# Patient Record
Sex: FEMALE | Race: WHITE | NOT HISPANIC OR LATINO | Employment: FULL TIME | URBAN - METROPOLITAN AREA
[De-identification: names, ages, dates, MRNs, and addresses within clinical notes are randomized per-mention and may not be internally consistent; named-entity substitution may affect disease eponyms.]

---

## 2017-01-13 ENCOUNTER — LAB (OUTPATIENT)
Dept: LAB | Facility: HOSPITAL | Age: 42
End: 2017-01-13

## 2017-01-13 DIAGNOSIS — Z34.81 PRENATAL CARE, SUBSEQUENT PREGNANCY, FIRST TRIMESTER: ICD-10-CM

## 2017-01-13 DIAGNOSIS — Z3A.08 8 WEEKS GESTATION OF PREGNANCY: Primary | ICD-10-CM

## 2017-01-13 LAB
ABO GROUP BLD: NORMAL
AMPHET+METHAMPHET UR QL: NEGATIVE
AMPHETAMINES UR QL: NEGATIVE
BACTERIA UR QL AUTO: ABNORMAL /HPF
BARBITURATES UR QL SCN: NEGATIVE
BASOPHILS # BLD AUTO: 0.01 10*3/MM3 (ref 0–0.2)
BASOPHILS NFR BLD AUTO: 0.1 % (ref 0–1)
BENZODIAZ UR QL SCN: NEGATIVE
BILIRUB UR QL STRIP: NEGATIVE
BLD GP AB SCN SERPL QL: NEGATIVE
BUPRENORPHINE SERPL-MCNC: NEGATIVE NG/ML
CANNABINOIDS SERPL QL: NEGATIVE
CLARITY UR: CLEAR
COCAINE UR QL: NEGATIVE
COLOR UR: YELLOW
DEPRECATED RDW RBC AUTO: 42.4 FL (ref 37–54)
EOSINOPHIL # BLD AUTO: 0.1 10*3/MM3 (ref 0.1–0.3)
EOSINOPHIL NFR BLD AUTO: 1.3 % (ref 0–3)
ERYTHROCYTE [DISTWIDTH] IN BLOOD BY AUTOMATED COUNT: 13 % (ref 11.3–14.5)
EXTERNAL ABO GROUPING: NORMAL
EXTERNAL ANTIBODY SCREEN: NEGATIVE
EXTERNAL HEPATITIS B SURFACE ANTIGEN: NEGATIVE
EXTERNAL HEPATITIS C AB: NEGATIVE
EXTERNAL RH FACTOR: POSITIVE
EXTERNAL RUBELLA QUALITATIVE: NORMAL
EXTERNAL SYPHILIS RPR SCREEN: NORMAL
EXTERNAL URINE DRUG SCREEN: NEGATIVE
GLUCOSE BLD-MCNC: 87 MG/DL (ref 70–100)
GLUCOSE UR STRIP-MCNC: NEGATIVE MG/DL
HBV SURFACE AG SERPL QL IA: NORMAL
HCT VFR BLD AUTO: 39.9 % (ref 34.5–44)
HCV AB SER DONR QL: NORMAL
HGB BLD-MCNC: 13.5 G/DL (ref 11.5–15.5)
HGB UR QL STRIP.AUTO: NEGATIVE
HIV1 AB SPEC QL IA.RAPID: NEGATIVE
HIV1+2 AB SER QL: NORMAL
HYALINE CASTS UR QL AUTO: ABNORMAL /LPF
IMM GRANULOCYTES # BLD: 0.02 10*3/MM3 (ref 0–0.03)
IMM GRANULOCYTES NFR BLD: 0.3 % (ref 0–0.6)
KETONES UR QL STRIP: NEGATIVE
LEUKOCYTE ESTERASE UR QL STRIP.AUTO: NEGATIVE
LYMPHOCYTES # BLD AUTO: 2.27 10*3/MM3 (ref 0.6–4.8)
LYMPHOCYTES NFR BLD AUTO: 30.5 % (ref 24–44)
MCH RBC QN AUTO: 30.4 PG (ref 27–31)
MCHC RBC AUTO-ENTMCNC: 33.8 G/DL (ref 32–36)
MCV RBC AUTO: 89.9 FL (ref 80–99)
METHADONE UR QL SCN: NEGATIVE
MONOCYTES # BLD AUTO: 0.55 10*3/MM3 (ref 0–1)
MONOCYTES NFR BLD AUTO: 7.4 % (ref 0–12)
NEUTROPHILS # BLD AUTO: 4.49 10*3/MM3 (ref 1.5–8.3)
NEUTROPHILS NFR BLD AUTO: 60.4 % (ref 41–71)
NITRITE UR QL STRIP: NEGATIVE
OPIATES UR QL: NEGATIVE
OXYCODONE UR QL SCN: NEGATIVE
PCP UR QL SCN: NEGATIVE
PH UR STRIP.AUTO: 6.5 [PH] (ref 5–8)
PLATELET # BLD AUTO: 288 10*3/MM3 (ref 150–450)
PMV BLD AUTO: 8.9 FL (ref 6–12)
PROPOXYPH UR QL: NEGATIVE
PROT UR QL STRIP: NEGATIVE
RBC # BLD AUTO: 4.44 10*6/MM3 (ref 3.89–5.14)
RBC # UR: ABNORMAL /HPF
REF LAB TEST METHOD: ABNORMAL
RH BLD: POSITIVE
RUBV IGG SER QL: NORMAL
RUBV IGG SER-ACNC: 97.9 IU/ML
SP GR UR STRIP: 1.01 (ref 1–1.03)
SQUAMOUS #/AREA URNS HPF: ABNORMAL /HPF
TRICYCLICS UR QL SCN: NEGATIVE
UROBILINOGEN UR QL STRIP: NORMAL
WBC NRBC COR # BLD: 7.44 10*3/MM3 (ref 3.5–10.8)
WBC UR QL AUTO: ABNORMAL /HPF

## 2017-01-13 PROCEDURE — 87340 HEPATITIS B SURFACE AG IA: CPT | Performed by: NURSE PRACTITIONER

## 2017-01-13 PROCEDURE — 86803 HEPATITIS C AB TEST: CPT | Performed by: NURSE PRACTITIONER

## 2017-01-13 PROCEDURE — 36415 COLL VENOUS BLD VENIPUNCTURE: CPT

## 2017-01-13 PROCEDURE — 82947 ASSAY GLUCOSE BLOOD QUANT: CPT | Performed by: NURSE PRACTITIONER

## 2017-01-13 PROCEDURE — 86592 SYPHILIS TEST NON-TREP QUAL: CPT | Performed by: NURSE PRACTITIONER

## 2017-01-13 PROCEDURE — 86900 BLOOD TYPING SEROLOGIC ABO: CPT

## 2017-01-13 PROCEDURE — 86901 BLOOD TYPING SEROLOGIC RH(D): CPT

## 2017-01-13 PROCEDURE — 87147 CULTURE TYPE IMMUNOLOGIC: CPT | Performed by: NURSE PRACTITIONER

## 2017-01-13 PROCEDURE — 87086 URINE CULTURE/COLONY COUNT: CPT | Performed by: NURSE PRACTITIONER

## 2017-01-13 PROCEDURE — 81001 URINALYSIS AUTO W/SCOPE: CPT | Performed by: NURSE PRACTITIONER

## 2017-01-13 PROCEDURE — G0432 EIA HIV-1/HIV-2 SCREEN: HCPCS | Performed by: NURSE PRACTITIONER

## 2017-01-13 PROCEDURE — 86850 RBC ANTIBODY SCREEN: CPT

## 2017-01-13 PROCEDURE — 80306 DRUG TEST PRSMV INSTRMNT: CPT | Performed by: NURSE PRACTITIONER

## 2017-01-13 PROCEDURE — 85025 COMPLETE CBC W/AUTO DIFF WBC: CPT | Performed by: NURSE PRACTITIONER

## 2017-01-15 LAB
BACTERIA SPEC AEROBE CULT: ABNORMAL
STREP GROUPING: ABNORMAL

## 2017-01-16 LAB — RPR SER QL: NORMAL

## 2017-02-13 ENCOUNTER — TRANSCRIBE ORDERS (OUTPATIENT)
Dept: OBSTETRICS AND GYNECOLOGY | Facility: HOSPITAL | Age: 42
End: 2017-02-13

## 2017-02-13 DIAGNOSIS — Z82.79 FAMILY HISTORY OF CONGENITAL HEART DEFECT: Primary | ICD-10-CM

## 2017-03-20 ENCOUNTER — HOSPITAL ENCOUNTER (OUTPATIENT)
Dept: WOMENS IMAGING | Facility: HOSPITAL | Age: 42
Discharge: HOME | End: 2017-03-20
Attending: NURSE PRACTITIONER | Admitting: NURSE PRACTITIONER

## 2017-03-20 ENCOUNTER — OFFICE VISIT (OUTPATIENT)
Dept: OBSTETRICS AND GYNECOLOGY | Facility: HOSPITAL | Age: 42
End: 2017-03-20

## 2017-03-20 VITALS
WEIGHT: 281 LBS | BODY MASS INDEX: 40.23 KG/M2 | HEIGHT: 70 IN | DIASTOLIC BLOOD PRESSURE: 72 MMHG | SYSTOLIC BLOOD PRESSURE: 144 MMHG

## 2017-03-20 DIAGNOSIS — O34.219 HISTORY OF CESAREAN DELIVERY, ANTEPARTUM: Primary | ICD-10-CM

## 2017-03-20 DIAGNOSIS — Z82.79 FAMILY HISTORY OF CONGENITAL HEART DEFECT: ICD-10-CM

## 2017-03-20 DIAGNOSIS — O09.522 AMA (ADVANCED MATERNAL AGE) MULTIGRAVIDA 35+, SECOND TRIMESTER: ICD-10-CM

## 2017-03-20 PROBLEM — O09.529 AMA (ADVANCED MATERNAL AGE) MULTIGRAVIDA 35+: Status: ACTIVE | Noted: 2017-03-20

## 2017-03-20 PROCEDURE — 76817 TRANSVAGINAL US OBSTETRIC: CPT

## 2017-03-20 PROCEDURE — 76817 TRANSVAGINAL US OBSTETRIC: CPT | Performed by: OBSTETRICS & GYNECOLOGY

## 2017-03-20 PROCEDURE — 76811 OB US DETAILED SNGL FETUS: CPT | Performed by: OBSTETRICS & GYNECOLOGY

## 2017-03-20 PROCEDURE — 76811 OB US DETAILED SNGL FETUS: CPT

## 2017-03-20 RX ORDER — PRENATAL WITH FERROUS FUM AND FOLIC ACID 3080; 920; 120; 400; 22; 1.84; 3; 20; 10; 1; 12; 200; 27; 25; 2 [IU]/1; [IU]/1; MG/1; [IU]/1; MG/1; MG/1; MG/1; MG/1; MG/1; MG/1; UG/1; MG/1; MG/1; MG/1; MG/1
1 TABLET ORAL DAILY
COMMUNITY
End: 2022-10-19

## 2017-03-20 NOTE — PROGRESS NOTES
Documentation of the ultrasound findings, images, and interpretations will be available in the patient's ViewPoint report located in the chart review imaging tab in [facility].

## 2017-04-19 ENCOUNTER — OFFICE VISIT (OUTPATIENT)
Dept: OBSTETRICS AND GYNECOLOGY | Facility: HOSPITAL | Age: 42
End: 2017-04-19

## 2017-04-19 ENCOUNTER — HOSPITAL ENCOUNTER (OUTPATIENT)
Dept: WOMENS IMAGING | Facility: HOSPITAL | Age: 42
Discharge: HOME | End: 2017-04-19
Attending: OBSTETRICS & GYNECOLOGY | Admitting: NURSE PRACTITIONER

## 2017-04-19 VITALS — DIASTOLIC BLOOD PRESSURE: 60 MMHG | WEIGHT: 283.8 LBS | BODY MASS INDEX: 40.72 KG/M2 | SYSTOLIC BLOOD PRESSURE: 124 MMHG

## 2017-04-19 DIAGNOSIS — O34.219 HISTORY OF CESAREAN DELIVERY, ANTEPARTUM: ICD-10-CM

## 2017-04-19 DIAGNOSIS — Z82.79 FAMILY HISTORY OF CONGENITAL HEART DEFECT: ICD-10-CM

## 2017-04-19 DIAGNOSIS — O09.522 AMA (ADVANCED MATERNAL AGE) MULTIGRAVIDA 35+, SECOND TRIMESTER: Primary | ICD-10-CM

## 2017-04-19 DIAGNOSIS — O09.522 AMA (ADVANCED MATERNAL AGE) MULTIGRAVIDA 35+, SECOND TRIMESTER: ICD-10-CM

## 2017-04-19 PROCEDURE — 76825 ECHO EXAM OF FETAL HEART: CPT | Performed by: OBSTETRICS & GYNECOLOGY

## 2017-04-19 PROCEDURE — 76825 ECHO EXAM OF FETAL HEART: CPT

## 2017-04-19 PROCEDURE — 76816 OB US FOLLOW-UP PER FETUS: CPT

## 2017-04-19 PROCEDURE — 93325 DOPPLER ECHO COLOR FLOW MAPG: CPT | Performed by: OBSTETRICS & GYNECOLOGY

## 2017-04-19 PROCEDURE — 76816 OB US FOLLOW-UP PER FETUS: CPT | Performed by: OBSTETRICS & GYNECOLOGY

## 2017-04-19 PROCEDURE — 93325 DOPPLER ECHO COLOR FLOW MAPG: CPT

## 2017-05-17 ENCOUNTER — TRANSCRIBE ORDERS (OUTPATIENT)
Dept: LAB | Facility: HOSPITAL | Age: 42
End: 2017-05-17

## 2017-05-17 ENCOUNTER — LAB (OUTPATIENT)
Dept: LAB | Facility: HOSPITAL | Age: 42
End: 2017-05-17

## 2017-05-17 DIAGNOSIS — Z3A.28 28 WEEKS GESTATION OF PREGNANCY: Primary | ICD-10-CM

## 2017-05-17 DIAGNOSIS — Z34.83 PRENATAL CARE, SUBSEQUENT PREGNANCY, THIRD TRIMESTER: ICD-10-CM

## 2017-05-17 DIAGNOSIS — Z3A.28 28 WEEKS GESTATION OF PREGNANCY: ICD-10-CM

## 2017-05-17 LAB
BLD GP AB SCN SERPL QL: NEGATIVE
DEPRECATED RDW RBC AUTO: 43.5 FL (ref 37–54)
ERYTHROCYTE [DISTWIDTH] IN BLOOD BY AUTOMATED COUNT: 13.3 % (ref 11.3–14.5)
EXTERNAL GTT 1 HOUR: 207
GLUCOSE 1H P 100 G GLC PO SERPL-MCNC: 207 MG/DL (ref 65–199)
HCT VFR BLD AUTO: 36 % (ref 34.5–44)
HGB BLD-MCNC: 12 G/DL (ref 11.5–15.5)
MCH RBC QN AUTO: 30.2 PG (ref 27–31)
MCHC RBC AUTO-ENTMCNC: 33.3 G/DL (ref 32–36)
MCV RBC AUTO: 90.5 FL (ref 80–99)
PLATELET # BLD AUTO: 243 10*3/MM3 (ref 150–450)
PMV BLD AUTO: 9.1 FL (ref 6–12)
RBC # BLD AUTO: 3.98 10*6/MM3 (ref 3.89–5.14)
WBC NRBC COR # BLD: 8.01 10*3/MM3 (ref 3.5–10.8)

## 2017-05-17 PROCEDURE — 82950 GLUCOSE TEST: CPT | Performed by: NURSE PRACTITIONER

## 2017-05-17 PROCEDURE — 85027 COMPLETE CBC AUTOMATED: CPT | Performed by: NURSE PRACTITIONER

## 2017-05-17 PROCEDURE — 86850 RBC ANTIBODY SCREEN: CPT | Performed by: NURSE PRACTITIONER

## 2017-05-17 PROCEDURE — 36415 COLL VENOUS BLD VENIPUNCTURE: CPT | Performed by: NURSE PRACTITIONER

## 2017-05-18 ENCOUNTER — TRANSCRIBE ORDERS (OUTPATIENT)
Dept: ENDOCRINOLOGY | Facility: CLINIC | Age: 42
End: 2017-05-18

## 2017-05-18 DIAGNOSIS — O24.419 GESTATIONAL DIABETES MELLITUS IN PREGNANCY, UNSPECIFIED CONTROL: Primary | ICD-10-CM

## 2017-05-19 ENCOUNTER — OFFICE VISIT (OUTPATIENT)
Dept: ENDOCRINOLOGY | Facility: CLINIC | Age: 42
End: 2017-05-19

## 2017-05-19 VITALS
BODY MASS INDEX: 41.77 KG/M2 | WEIGHT: 282 LBS | HEIGHT: 69 IN | HEART RATE: 80 BPM | SYSTOLIC BLOOD PRESSURE: 102 MMHG | DIASTOLIC BLOOD PRESSURE: 64 MMHG | OXYGEN SATURATION: 98 %

## 2017-05-19 DIAGNOSIS — O24.410 DIET CONTROLLED GESTATIONAL DIABETES MELLITUS (GDM) IN THIRD TRIMESTER: Primary | ICD-10-CM

## 2017-05-19 PROCEDURE — 99243 OFF/OP CNSLTJ NEW/EST LOW 30: CPT | Performed by: INTERNAL MEDICINE

## 2017-05-19 RX ORDER — LANCETS 33 GAUGE
EACH MISCELLANEOUS
Qty: 100 EACH | Refills: 5 | Status: SHIPPED | OUTPATIENT
Start: 2017-05-19 | End: 2022-10-19

## 2017-05-19 RX ORDER — CHLORPHENIR/PHENYLEPH/ASPIRIN 2-7.8-325
1 TABLET, EFFERVESCENT ORAL DAILY
Qty: 50 STRIP | Refills: 2 | Status: SHIPPED | OUTPATIENT
Start: 2017-05-19 | End: 2022-10-19

## 2017-06-15 ENCOUNTER — LAB (OUTPATIENT)
Dept: LAB | Facility: HOSPITAL | Age: 42
End: 2017-06-15

## 2017-06-15 DIAGNOSIS — Z3A.30 30 WEEKS GESTATION OF PREGNANCY: Primary | ICD-10-CM

## 2017-06-15 LAB
ALP SERPL-CCNC: 120 U/L (ref 25–100)
ALT SERPL W P-5'-P-CCNC: 30 U/L (ref 7–40)
AST SERPL-CCNC: 30 U/L (ref 0–33)
BILIRUB SERPL-MCNC: 0.4 MG/DL (ref 0.3–1.2)
CREAT BLD-MCNC: 0.5 MG/DL (ref 0.6–1.3)
DEPRECATED RDW RBC AUTO: 44.2 FL (ref 37–54)
ERYTHROCYTE [DISTWIDTH] IN BLOOD BY AUTOMATED COUNT: 13.4 % (ref 11.3–14.5)
HCT VFR BLD AUTO: 36.5 % (ref 34.5–44)
HGB BLD-MCNC: 12.1 G/DL (ref 11.5–15.5)
LDH SERPL-CCNC: 162 U/L (ref 120–246)
MCH RBC QN AUTO: 30.1 PG (ref 27–31)
MCHC RBC AUTO-ENTMCNC: 33.2 G/DL (ref 32–36)
MCV RBC AUTO: 90.8 FL (ref 80–99)
PLATELET # BLD AUTO: 253 10*3/MM3 (ref 150–450)
PMV BLD AUTO: 9 FL (ref 6–12)
RBC # BLD AUTO: 4.02 10*6/MM3 (ref 3.89–5.14)
URATE SERPL-MCNC: 4.4 MG/DL (ref 3.1–7.8)
WBC NRBC COR # BLD: 9.86 10*3/MM3 (ref 3.5–10.8)

## 2017-06-15 PROCEDURE — 36415 COLL VENOUS BLD VENIPUNCTURE: CPT

## 2017-06-15 PROCEDURE — 84450 TRANSFERASE (AST) (SGOT): CPT | Performed by: NURSE PRACTITIONER

## 2017-06-15 PROCEDURE — 84460 ALANINE AMINO (ALT) (SGPT): CPT | Performed by: NURSE PRACTITIONER

## 2017-06-15 PROCEDURE — 83615 LACTATE (LD) (LDH) ENZYME: CPT | Performed by: NURSE PRACTITIONER

## 2017-06-15 PROCEDURE — 85027 COMPLETE CBC AUTOMATED: CPT | Performed by: NURSE PRACTITIONER

## 2017-06-15 PROCEDURE — 84075 ASSAY ALKALINE PHOSPHATASE: CPT | Performed by: NURSE PRACTITIONER

## 2017-06-15 PROCEDURE — 84550 ASSAY OF BLOOD/URIC ACID: CPT | Performed by: NURSE PRACTITIONER

## 2017-06-15 PROCEDURE — 82247 BILIRUBIN TOTAL: CPT | Performed by: NURSE PRACTITIONER

## 2017-06-15 PROCEDURE — 82565 ASSAY OF CREATININE: CPT | Performed by: NURSE PRACTITIONER

## 2017-06-23 ENCOUNTER — OFFICE VISIT (OUTPATIENT)
Dept: ENDOCRINOLOGY | Facility: CLINIC | Age: 42
End: 2017-06-23

## 2017-06-23 VITALS
DIASTOLIC BLOOD PRESSURE: 64 MMHG | BODY MASS INDEX: 39.8 KG/M2 | SYSTOLIC BLOOD PRESSURE: 118 MMHG | WEIGHT: 278 LBS | HEIGHT: 70 IN

## 2017-06-23 DIAGNOSIS — O24.410 DIET CONTROLLED GESTATIONAL DIABETES MELLITUS (GDM) IN THIRD TRIMESTER: Primary | ICD-10-CM

## 2017-06-23 PROCEDURE — 99213 OFFICE O/P EST LOW 20 MIN: CPT | Performed by: INTERNAL MEDICINE

## 2017-06-23 RX ORDER — CLOBETASOL PROPIONATE 0.5 MG/G
CREAM TOPICAL
COMMUNITY
Start: 2017-05-08 | End: 2022-10-19

## 2017-06-23 RX ORDER — URINE GLUCOSE-ACET TEST STRIP
STRIP MISCELLANEOUS
Refills: 0 | COMMUNITY
Start: 2017-05-22 | End: 2022-10-19

## 2017-06-23 NOTE — PROGRESS NOTES
Larissa Jones 41 y.o.  CC:Follow-up and Gestational Diabetes (JAMSHID 2017, OB:  Bijal Suarez CNM)      [place]: Follow-up and Gestational Diabetes (JAMSHID 2017, OB:  Bijal Suarez CNM)    Blood sugars reviewed for last week and are normal  She is currently 31 weeks  No problems or questions today  Baby moving well    Allergies   Allergen Reactions   • Bee Venom Swelling   • Imitrex [Sumatriptan] Shortness Of Breath       Current Outpatient Prescriptions:   •  Acetone, Urine, Test (KETONE TEST) strip, 1 strip by In Vitro route Daily. Test urine ketones daily in am, Disp: 50 strip, Rfl: 2  •  clobetasol (TEMOVATE) 0.05 % cream, , Disp: , Rfl:   •  glucose blood (ONETOUCH VERIO) test strip, Test blood sugars QID, Disp: 100 each, Rfl: 5  •  ONETOUCH DELICA LANCETS 33G misc, Test blood sugars QID, Disp: 100 each, Rfl: 5  •  Prenatal Vit-Fe Fumarate-FA (PRENATAL ) 27-1 MG tablet tablet, Take 1 tablet by mouth Daily., Disp: , Rfl:   •  Urine Glucose-Ketones Test (KETO-DIASTIX) strip, , Disp: , Rfl: 0  Patient Active Problem List    Diagnosis   • Diet controlled gestational diabetes mellitus (GDM) in third trimester [O24.410]   • Family history of congenital heart defect [Z82.79]   • AMA (advanced maternal age) multigravida 35+ [O09.529]   • History of  delivery, antepartum [O34.219]     Review of Systems   Constitutional: Negative for activity change, appetite change, chills, diaphoresis, fatigue, fever and unexpected weight change.   HENT: Negative for congestion, dental problem, drooling, ear discharge, ear pain, facial swelling, hearing loss, mouth sores, nosebleeds, postnasal drip, rhinorrhea, sinus pressure, sneezing, sore throat, tinnitus, trouble swallowing and voice change.    Eyes: Negative for photophobia, pain, discharge, redness, itching and visual disturbance.   Respiratory: Negative for apnea, cough, choking, chest tightness, shortness of breath, wheezing and stridor.    Cardiovascular:  "Negative for chest pain, palpitations and leg swelling.   Gastrointestinal: Negative for abdominal distention, abdominal pain, anal bleeding, blood in stool, constipation, diarrhea, nausea, rectal pain and vomiting.   Endocrine: Negative for cold intolerance, heat intolerance, polydipsia, polyphagia and polyuria.   Genitourinary: Negative for decreased urine volume, difficulty urinating, dysuria, enuresis, flank pain, frequency, genital sores, hematuria and urgency.   Musculoskeletal: Negative for arthralgias, back pain, gait problem, joint swelling, myalgias, neck pain and neck stiffness.   Skin: Negative for color change, pallor, rash and wound.   Allergic/Immunologic: Negative for environmental allergies, food allergies and immunocompromised state.   Neurological: Negative for dizziness, tremors, seizures, syncope, facial asymmetry, speech difficulty, weakness, light-headedness, numbness and headaches.   Hematological: Negative for adenopathy. Does not bruise/bleed easily.   Psychiatric/Behavioral: Negative for agitation, behavioral problems, confusion, decreased concentration, dysphoric mood, hallucinations, self-injury, sleep disturbance and suicidal ideas. The patient is not nervous/anxious and is not hyperactive.      Social History     Social History   • Marital status:      Spouse name: N/A   • Number of children: N/A   • Years of education: N/A     Occupational History   • Not on file.     Social History Main Topics   • Smoking status: Never Smoker   • Smokeless tobacco: Never Used   • Alcohol use No   • Drug use: No   • Sexual activity: Defer     Other Topics Concern   • Not on file     Social History Narrative     Family History   Problem Relation Age of Onset   • Stroke Mother    • Thyroid disease Mother    • Arthritis Father    • Heart attack Father    • Hyperlipidemia Father    • Breast cancer Maternal Grandmother      /64  Ht 70\" (177.8 cm)  Wt 278 lb (126 kg)  LMP 11/21/2016  BMI " 39.89 kg/m2  Physical Exam   Constitutional: She is oriented to person, place, and time. She appears well-developed and well-nourished.   HENT:   Head: Normocephalic and atraumatic.   Nose: Nose normal.   Mouth/Throat: Oropharynx is clear and moist.   Eyes: Conjunctivae, EOM and lids are normal. Pupils are equal, round, and reactive to light.   Neck: Trachea normal and normal range of motion. Neck supple. Carotid bruit is not present. No tracheal deviation present. No thyroid mass and no thyromegaly present.   Cardiovascular: Normal rate, regular rhythm, normal heart sounds and intact distal pulses.  Exam reveals no gallop and no friction rub.    No murmur heard.  Pulmonary/Chest: Effort normal and breath sounds normal. No respiratory distress. She has no wheezes.   Musculoskeletal: Normal range of motion. She exhibits no edema or deformity.   Lymphadenopathy:     She has no cervical adenopathy.   Neurological: She is alert and oriented to person, place, and time. She has normal reflexes. She displays normal reflexes. No cranial nerve deficit.   Skin: Skin is warm and dry. No rash noted. No cyanosis or erythema. Nails show no clubbing.   Psychiatric: She has a normal mood and affect. Her speech is normal and behavior is normal. Judgment and thought content normal. Cognition and memory are normal.   Nursing note and vitals reviewed.    Results for orders placed or performed in visit on 06/15/17   Preeclampsia Panel   Result Value Ref Range    Alkaline Phosphatase 120 (H) 25 - 100 U/L    ALT (SGPT) 30 7 - 40 U/L    AST (SGOT) 30 0 - 33 U/L    Creatinine 0.50 (L) 0.60 - 1.30 mg/dL    Total Bilirubin 0.4 0.3 - 1.2 mg/dL     120 - 246 U/L    Uric Acid 4.4 3.1 - 7.8 mg/dL   CBC (No Diff)   Result Value Ref Range    WBC 9.86 3.50 - 10.80 10*3/mm3    RBC 4.02 3.89 - 5.14 10*6/mm3    Hemoglobin 12.1 11.5 - 15.5 g/dL    Hematocrit 36.5 34.5 - 44.0 %    MCV 90.8 80.0 - 99.0 fL    MCH 30.1 27.0 - 31.0 pg    MCHC 33.2  32.0 - 36.0 g/dL    RDW 13.4 11.3 - 14.5 %    RDW-SD 44.2 37.0 - 54.0 fl    MPV 9.0 6.0 - 12.0 fL    Platelets 253 150 - 450 10*3/mm3     Problem List Items Addressed This Visit        Endocrine    Diet controlled gestational diabetes mellitus (GDM) in third trimester - Primary     Discussed blood sugars and ketones with her - no sugars over goal   Discussed kick counts and eval if decreased fetal activity  Discussed swelling and normal bp   Is having some floaters as well- has discussed with OB and will see Ophthalm if worsens  Continue testing fasting and 2 hour pp   email weekly   F/u 4 weeks                Pauline Denson MA  Signed Layne Koenig MD

## 2017-06-23 NOTE — ASSESSMENT & PLAN NOTE
Discussed blood sugars and ketones with her - no sugars over goal   Discussed kick counts and eval if decreased fetal activity  Discussed swelling and normal bp   Is having some floaters as well- has discussed with OB and will see Ophthalm if worsens  Continue testing fasting and 2 hour pp   email weekly   F/u 4 weeks

## 2017-06-28 ENCOUNTER — APPOINTMENT (OUTPATIENT)
Dept: WOMENS IMAGING | Facility: HOSPITAL | Age: 42
End: 2017-06-28
Attending: OBSTETRICS & GYNECOLOGY

## 2017-06-29 ENCOUNTER — LAB (OUTPATIENT)
Dept: LAB | Facility: HOSPITAL | Age: 42
End: 2017-06-29

## 2017-06-29 DIAGNOSIS — Z3A.32 32 WEEKS GESTATION OF PREGNANCY: Primary | ICD-10-CM

## 2017-06-29 LAB
ALP SERPL-CCNC: 133 U/L (ref 25–100)
ALT SERPL W P-5'-P-CCNC: 25 U/L (ref 7–40)
AST SERPL-CCNC: 23 U/L (ref 0–33)
BILIRUB SERPL-MCNC: 0.3 MG/DL (ref 0.3–1.2)
CREAT BLD-MCNC: 0.6 MG/DL (ref 0.6–1.3)
DEPRECATED RDW RBC AUTO: 44.5 FL (ref 37–54)
ERYTHROCYTE [DISTWIDTH] IN BLOOD BY AUTOMATED COUNT: 13.3 % (ref 11.3–14.5)
HCT VFR BLD AUTO: 35.9 % (ref 34.5–44)
HGB BLD-MCNC: 11.9 G/DL (ref 11.5–15.5)
LDH SERPL-CCNC: 176 U/L (ref 120–246)
MCH RBC QN AUTO: 30.3 PG (ref 27–31)
MCHC RBC AUTO-ENTMCNC: 33.1 G/DL (ref 32–36)
MCV RBC AUTO: 91.3 FL (ref 80–99)
PLATELET # BLD AUTO: 233 10*3/MM3 (ref 150–450)
PMV BLD AUTO: 9.3 FL (ref 6–12)
RBC # BLD AUTO: 3.93 10*6/MM3 (ref 3.89–5.14)
URATE SERPL-MCNC: 4 MG/DL (ref 3.1–7.8)
WBC NRBC COR # BLD: 8.57 10*3/MM3 (ref 3.5–10.8)

## 2017-06-29 PROCEDURE — 84460 ALANINE AMINO (ALT) (SGPT): CPT | Performed by: NURSE PRACTITIONER

## 2017-06-29 PROCEDURE — 83615 LACTATE (LD) (LDH) ENZYME: CPT | Performed by: NURSE PRACTITIONER

## 2017-06-29 PROCEDURE — 85027 COMPLETE CBC AUTOMATED: CPT | Performed by: NURSE PRACTITIONER

## 2017-06-29 PROCEDURE — 84075 ASSAY ALKALINE PHOSPHATASE: CPT | Performed by: NURSE PRACTITIONER

## 2017-06-29 PROCEDURE — 82565 ASSAY OF CREATININE: CPT | Performed by: NURSE PRACTITIONER

## 2017-06-29 PROCEDURE — 84450 TRANSFERASE (AST) (SGOT): CPT | Performed by: NURSE PRACTITIONER

## 2017-06-29 PROCEDURE — 84550 ASSAY OF BLOOD/URIC ACID: CPT | Performed by: NURSE PRACTITIONER

## 2017-06-29 PROCEDURE — 82247 BILIRUBIN TOTAL: CPT | Performed by: NURSE PRACTITIONER

## 2017-06-29 PROCEDURE — 36415 COLL VENOUS BLD VENIPUNCTURE: CPT

## 2017-07-05 ENCOUNTER — OFFICE VISIT (OUTPATIENT)
Dept: OBSTETRICS AND GYNECOLOGY | Facility: HOSPITAL | Age: 42
End: 2017-07-05

## 2017-07-05 ENCOUNTER — HOSPITAL ENCOUNTER (OUTPATIENT)
Dept: WOMENS IMAGING | Facility: HOSPITAL | Age: 42
Discharge: HOME | End: 2017-07-05
Attending: OBSTETRICS & GYNECOLOGY | Admitting: NURSE PRACTITIONER

## 2017-07-05 VITALS — WEIGHT: 277.2 LBS | SYSTOLIC BLOOD PRESSURE: 113 MMHG | BODY MASS INDEX: 39.77 KG/M2 | DIASTOLIC BLOOD PRESSURE: 57 MMHG

## 2017-07-05 DIAGNOSIS — O24.410 DIET CONTROLLED GESTATIONAL DIABETES MELLITUS (GDM) IN THIRD TRIMESTER: ICD-10-CM

## 2017-07-05 DIAGNOSIS — Z82.79 FAMILY HISTORY OF CONGENITAL HEART DEFECT: ICD-10-CM

## 2017-07-05 DIAGNOSIS — O09.522 AMA (ADVANCED MATERNAL AGE) MULTIGRAVIDA 35+, SECOND TRIMESTER: ICD-10-CM

## 2017-07-05 DIAGNOSIS — O09.523 AMA (ADVANCED MATERNAL AGE) MULTIGRAVIDA 35+, THIRD TRIMESTER: Primary | ICD-10-CM

## 2017-07-05 DIAGNOSIS — O34.219 HISTORY OF CESAREAN DELIVERY, ANTEPARTUM: ICD-10-CM

## 2017-07-05 LAB
BILIRUB BLD-MCNC: NEGATIVE MG/DL
CLARITY, POC: CLEAR
COLOR UR: NORMAL
GLUCOSE UR STRIP-MCNC: NEGATIVE MG/DL
KETONES UR QL: NEGATIVE
LEUKOCYTE EST, POC: NEGATIVE
NITRITE UR-MCNC: NEGATIVE MG/ML
PH UR: 6 [PH] (ref 5–8)
PROT UR STRIP-MCNC: NEGATIVE MG/DL
RBC # UR STRIP: NEGATIVE /UL
SP GR UR: 1.01 (ref 1–1.03)
UROBILINOGEN UR QL: NORMAL

## 2017-07-05 PROCEDURE — 76816 OB US FOLLOW-UP PER FETUS: CPT

## 2017-07-05 PROCEDURE — 76816 OB US FOLLOW-UP PER FETUS: CPT | Performed by: OBSTETRICS & GYNECOLOGY

## 2017-07-05 NOTE — PROGRESS NOTES
"Denies problems with pregnancy. Has been diagnosed with GDM and sees Dr. Koenig. Reports seeing \"floaters\" past month but denies any other symptoms except occasional \"small\" headache that is relieved with Tylenol. Also states labs to evaluate for preeclampsia have been normal x2.   "

## 2017-07-05 NOTE — PROGRESS NOTES
Documentation of the ultrasound findings, images, and interpretations will be available in the patient's ViewPoint report located in the chart review imaging tab in [facility].

## 2017-07-27 LAB — EXTERNAL GROUP B STREP ANTIGEN: POSITIVE

## 2017-08-03 ENCOUNTER — OFFICE VISIT (OUTPATIENT)
Dept: ENDOCRINOLOGY | Facility: CLINIC | Age: 42
End: 2017-08-03

## 2017-08-03 VITALS
OXYGEN SATURATION: 98 % | WEIGHT: 281 LBS | HEIGHT: 70 IN | SYSTOLIC BLOOD PRESSURE: 102 MMHG | DIASTOLIC BLOOD PRESSURE: 60 MMHG | BODY MASS INDEX: 40.23 KG/M2 | HEART RATE: 85 BPM

## 2017-08-03 DIAGNOSIS — O24.410 DIET CONTROLLED GESTATIONAL DIABETES MELLITUS (GDM) IN THIRD TRIMESTER: Primary | ICD-10-CM

## 2017-08-03 LAB
GLUCOSE BLDC GLUCOMTR-MCNC: 107 MG/DL (ref 70–130)
HBA1C MFR BLD: 5.6 %

## 2017-08-03 PROCEDURE — 99213 OFFICE O/P EST LOW 20 MIN: CPT | Performed by: INTERNAL MEDICINE

## 2017-08-03 PROCEDURE — 82947 ASSAY GLUCOSE BLOOD QUANT: CPT | Performed by: INTERNAL MEDICINE

## 2017-08-03 PROCEDURE — 83036 HEMOGLOBIN GLYCOSYLATED A1C: CPT | Performed by: INTERNAL MEDICINE

## 2017-08-03 NOTE — PROGRESS NOTES
Larissa Jones 41 y.o.  CC:Follow-up and Gestational Diabetes (JAMSHID 2017 with scheduled C section on 2017 OB:  Bijal Suarez CNM, and Kaela Blue MD)    [place]: Follow-up and Gestational Diabetes (JAMSHID 2017 with scheduled C section on 2017 OB:  Bijal Suarez CNM, and Kaela Blue MD)    Blood sugar and 90 day average sugar reviewed  Results for orders placed or performed in visit on 17   POC Glycosylated Hemoglobin (Hb A1C)   Result Value Ref Range    Hemoglobin A1C 5.6 %   POC Glucose Fingerstick   Result Value Ref Range    Glucose 107 70 - 130 mg/dL     Is 37 weeks- baby has dropped   Baby 7 and 1/2 lbs last week   Increased abdominal circumference- discussed diet control with her  Reviewed blood sugars last week- fasting and pp all normal with the exception of 1 sugar \    Allergies   Allergen Reactions   • Bee Venom Swelling   • Imitrex [Sumatriptan] Shortness Of Breath       Current Outpatient Prescriptions:   •  Acetone, Urine, Test (KETONE TEST) strip, 1 strip by In Vitro route Daily. Test urine ketones daily in am, Disp: 50 strip, Rfl: 2  •  clobetasol (TEMOVATE) 0.05 % cream, , Disp: , Rfl:   •  glucose blood (ONETOUCH VERIO) test strip, Test blood sugars QID, Disp: 100 each, Rfl: 5  •  ONETOUCH DELICA LANCETS 33G misc, Test blood sugars QID, Disp: 100 each, Rfl: 5  •  Prenatal Vit-Fe Fumarate-FA (PRENATAL ) 27-1 MG tablet tablet, Take 1 tablet by mouth Daily., Disp: , Rfl:   •  Urine Glucose-Ketones Test (KETO-DIASTIX) strip, , Disp: , Rfl: 0  Patient Active Problem List    Diagnosis   • Diet controlled gestational diabetes mellitus (GDM) in third trimester [O24.410]   • Family history of congenital heart defect [Z82.79]   • AMA (advanced maternal age) multigravida 35+ [O09.529]   • History of  delivery, antepartum [O34.219]     Review of Systems   Constitutional: Negative for activity change, appetite change, chills, diaphoresis, fatigue, fever and unexpected  weight change.   HENT: Negative for congestion, dental problem, drooling, ear discharge, ear pain, facial swelling, hearing loss, mouth sores, nosebleeds, postnasal drip, rhinorrhea, sinus pressure, sneezing, sore throat, tinnitus, trouble swallowing and voice change.    Eyes: Negative for photophobia, pain, discharge, redness, itching and visual disturbance.   Respiratory: Negative for apnea, cough, choking, chest tightness, shortness of breath, wheezing and stridor.    Cardiovascular: Negative for chest pain, palpitations and leg swelling.   Gastrointestinal: Negative for abdominal distention, abdominal pain, anal bleeding, blood in stool, constipation, diarrhea, nausea, rectal pain and vomiting.   Endocrine: Negative for cold intolerance, heat intolerance, polydipsia, polyphagia and polyuria.   Genitourinary: Negative for decreased urine volume, difficulty urinating, dysuria, enuresis, flank pain, frequency, genital sores, hematuria and urgency.   Musculoskeletal: Negative for arthralgias, back pain, gait problem, joint swelling, myalgias, neck pain and neck stiffness.   Skin: Negative for color change, pallor, rash and wound.   Allergic/Immunologic: Negative for environmental allergies, food allergies and immunocompromised state.   Neurological: Negative for dizziness, tremors, seizures, syncope, facial asymmetry, speech difficulty, weakness, light-headedness, numbness and headaches.   Hematological: Negative for adenopathy. Does not bruise/bleed easily.   Psychiatric/Behavioral: Negative for agitation, behavioral problems, confusion, decreased concentration, dysphoric mood, hallucinations, self-injury, sleep disturbance and suicidal ideas. The patient is not nervous/anxious and is not hyperactive.      Social History     Social History   • Marital status:      Spouse name: N/A   • Number of children: N/A   • Years of education: N/A     Occupational History   • Not on file.     Social History Main Topics  "  • Smoking status: Never Smoker   • Smokeless tobacco: Never Used   • Alcohol use No   • Drug use: No   • Sexual activity: Defer     Other Topics Concern   • Not on file     Social History Narrative     Family History   Problem Relation Age of Onset   • Stroke Mother    • Thyroid disease Mother    • Arthritis Father    • Heart attack Father    • Hyperlipidemia Father    • Breast cancer Maternal Grandmother      /60  Pulse 85  Ht 70\" (177.8 cm)  Wt 281 lb (127 kg)  LMP 11/21/2016  SpO2 98%  BMI 40.32 kg/m2  Physical Exam   Constitutional: She is oriented to person, place, and time. She appears well-developed and well-nourished.   HENT:   Head: Normocephalic and atraumatic.   Nose: Nose normal.   Mouth/Throat: Oropharynx is clear and moist.   Eyes: Conjunctivae, EOM and lids are normal. Pupils are equal, round, and reactive to light.   Neck: Trachea normal and normal range of motion. Neck supple. Carotid bruit is not present. No tracheal deviation present. No thyroid mass and no thyromegaly present.   Cardiovascular: Normal rate, regular rhythm, normal heart sounds and intact distal pulses.  Exam reveals no gallop and no friction rub.    No murmur heard.  Pulmonary/Chest: Effort normal and breath sounds normal. No respiratory distress. She has no wheezes.   Musculoskeletal: Normal range of motion. She exhibits no edema or deformity.   Lymphadenopathy:     She has no cervical adenopathy.   Neurological: She is alert and oriented to person, place, and time. She has normal reflexes. She displays normal reflexes. No cranial nerve deficit.   Skin: Skin is warm and dry. No rash noted. No cyanosis or erythema. Nails show no clubbing.   Psychiatric: She has a normal mood and affect. Her speech is normal and behavior is normal. Judgment and thought content normal. Cognition and memory are normal.   Nursing note and vitals reviewed.    Results for orders placed or performed in visit on 07/05/17   POC Urinalysis " Dipstick   Result Value Ref Range    Color Dark Yellow Yellow, Straw, Dark Yellow, [place]    Clarity, UA Clear Clear    Glucose, UA Negative Negative, 1000 mg/dL (3+) mg/dL    Bilirubin Negative Negative    Ketones, UA Negative Negative    Specific Gravity  1.015 1.005 - 1.030    Blood, UA Negative Negative    pH, Urine 6.0 5.0 - 8.0    Protein, POC Negative Negative mg/dL    Urobilinogen, UA Normal Normal    Leukocytes Negative Negative    Nitrite, UA Negative Negative     Problem List Items Addressed This Visit        Endocrine    Diet controlled gestational diabetes mellitus (GDM) in third trimester - Primary     Blood sugar and 90 day average sugar reviewed  Results for orders placed or performed in visit on 08/03/17   POC Glycosylated Hemoglobin (Hb A1C)   Result Value Ref Range    Hemoglobin A1C 5.6 %   POC Glucose Fingerstick   Result Value Ref Range    Glucose 107 70 - 130 mg/dL     Reminded her about blood sugar control/goals and rationale for goals - risk of higher sugars to her and baby reviewed  She is doing kick counts  Discussed prompt evaluation for any change in activity  F/u 12 weeks          Relevant Orders    POC Glycosylated Hemoglobin (Hb A1C) (Completed)    POC Glucose Fingerstick (Completed)        Return in about 10 weeks (around 10/12/2017) for Recheck 30 min .    Pauline Denson MA  Signed Layne Koenig MD

## 2017-08-03 NOTE — ASSESSMENT & PLAN NOTE
Blood sugar and 90 day average sugar reviewed  Results for orders placed or performed in visit on 08/03/17   POC Glycosylated Hemoglobin (Hb A1C)   Result Value Ref Range    Hemoglobin A1C 5.6 %   POC Glucose Fingerstick   Result Value Ref Range    Glucose 107 70 - 130 mg/dL     Reminded her about blood sugar control/goals and rationale for goals - risk of higher sugars to her and baby reviewed  She is doing kick counts  Discussed prompt evaluation for any change in activity  F/u 12 weeks

## 2017-08-17 ENCOUNTER — APPOINTMENT (OUTPATIENT)
Dept: PREADMISSION TESTING | Facility: HOSPITAL | Age: 42
End: 2017-08-17

## 2017-08-17 VITALS — WEIGHT: 277.78 LBS | HEIGHT: 69 IN | BODY MASS INDEX: 41.14 KG/M2

## 2017-08-17 LAB
ABO GROUP BLD: NORMAL
BLD GP AB SCN SERPL QL: NEGATIVE
DEPRECATED RDW RBC AUTO: 42.1 FL (ref 37–54)
ERYTHROCYTE [DISTWIDTH] IN BLOOD BY AUTOMATED COUNT: 13.1 % (ref 11.3–14.5)
HCT VFR BLD AUTO: 34.3 % (ref 34.5–44)
HGB BLD-MCNC: 11.6 G/DL (ref 11.5–15.5)
MCH RBC QN AUTO: 29.8 PG (ref 27–31)
MCHC RBC AUTO-ENTMCNC: 33.8 G/DL (ref 32–36)
MCV RBC AUTO: 88.2 FL (ref 80–99)
PLATELET # BLD AUTO: 239 10*3/MM3 (ref 150–450)
PMV BLD AUTO: 9.1 FL (ref 6–12)
RBC # BLD AUTO: 3.89 10*6/MM3 (ref 3.89–5.14)
RH BLD: POSITIVE
WBC NRBC COR # BLD: 9.89 10*3/MM3 (ref 3.5–10.8)

## 2017-08-18 ENCOUNTER — ANESTHESIA (OUTPATIENT)
Dept: LABOR AND DELIVERY | Facility: HOSPITAL | Age: 42
End: 2017-08-18

## 2017-08-18 ENCOUNTER — HOSPITAL ENCOUNTER (INPATIENT)
Facility: HOSPITAL | Age: 42
LOS: 2 days | Discharge: HOME | End: 2017-08-20
Attending: OBSTETRICS & GYNECOLOGY | Admitting: OBSTETRICS & GYNECOLOGY

## 2017-08-18 ENCOUNTER — ANESTHESIA EVENT (OUTPATIENT)
Dept: LABOR AND DELIVERY | Facility: HOSPITAL | Age: 42
End: 2017-08-18

## 2017-08-18 DIAGNOSIS — Z30.2 STERILIZATION: ICD-10-CM

## 2017-08-18 PROBLEM — Z34.90 TERM PREGNANCY: Status: RESOLVED | Noted: 2017-08-18 | Resolved: 2017-08-18

## 2017-08-18 PROBLEM — Z34.90 TERM PREGNANCY: Status: ACTIVE | Noted: 2017-08-18

## 2017-08-18 PROBLEM — Z98.891 STATUS POST REPEAT LOW TRANSVERSE CESAREAN SECTION: Status: ACTIVE | Noted: 2017-08-18

## 2017-08-18 PROCEDURE — 25010000002 MIDAZOLAM PER 1 MG: Performed by: NURSE ANESTHETIST, CERTIFIED REGISTERED

## 2017-08-18 PROCEDURE — 25010000003 CEFAZOLIN IN DEXTROSE 2-4 GM/100ML-% SOLUTION: Performed by: OBSTETRICS & GYNECOLOGY

## 2017-08-18 PROCEDURE — 25010000002 PHENYLEPHRINE PER 1 ML: Performed by: NURSE ANESTHETIST, CERTIFIED REGISTERED

## 2017-08-18 PROCEDURE — 25010000002 KETOROLAC TROMETHAMINE PER 15 MG: Performed by: NURSE ANESTHETIST, CERTIFIED REGISTERED

## 2017-08-18 PROCEDURE — 88302 TISSUE EXAM BY PATHOLOGIST: CPT | Performed by: OBSTETRICS & GYNECOLOGY

## 2017-08-18 PROCEDURE — 25010000002 ONDANSETRON PER 1 MG: Performed by: NURSE ANESTHETIST, CERTIFIED REGISTERED

## 2017-08-18 PROCEDURE — 25010000003 MORPHINE PER 10 MG: Performed by: NURSE ANESTHETIST, CERTIFIED REGISTERED

## 2017-08-18 PROCEDURE — 59025 FETAL NON-STRESS TEST: CPT

## 2017-08-18 PROCEDURE — 25010000002 DIPHENHYDRAMINE PER 50 MG: Performed by: NURSE ANESTHETIST, CERTIFIED REGISTERED

## 2017-08-18 PROCEDURE — 59514 CESAREAN DELIVERY ONLY: CPT | Performed by: OBSTETRICS & GYNECOLOGY

## 2017-08-18 PROCEDURE — 25010000002 FENTANYL CITRATE (PF) 100 MCG/2ML SOLUTION: Performed by: NURSE ANESTHETIST, CERTIFIED REGISTERED

## 2017-08-18 PROCEDURE — 0UL70ZZ OCCLUSION OF BILATERAL FALLOPIAN TUBES, OPEN APPROACH: ICD-10-PCS | Performed by: OBSTETRICS & GYNECOLOGY

## 2017-08-18 RX ORDER — PRENATAL VIT/IRON FUM/FOLIC AC 27MG-0.8MG
1 TABLET ORAL DAILY
Status: DISCONTINUED | OUTPATIENT
Start: 2017-08-18 | End: 2017-08-20 | Stop reason: HOSPADM

## 2017-08-18 RX ORDER — DOCUSATE SODIUM 100 MG/1
100 CAPSULE, LIQUID FILLED ORAL 2 TIMES DAILY PRN
Status: DISCONTINUED | OUTPATIENT
Start: 2017-08-18 | End: 2017-08-20 | Stop reason: HOSPADM

## 2017-08-18 RX ORDER — ONDANSETRON 4 MG/1
4 TABLET, FILM COATED ORAL EVERY 6 HOURS PRN
Status: DISCONTINUED | OUTPATIENT
Start: 2017-08-18 | End: 2017-08-18 | Stop reason: HOSPADM

## 2017-08-18 RX ORDER — BUPIVACAINE HYDROCHLORIDE 7.5 MG/ML
INJECTION, SOLUTION EPIDURAL; RETROBULBAR AS NEEDED
Status: DISCONTINUED | OUTPATIENT
Start: 2017-08-18 | End: 2017-08-18 | Stop reason: SURG

## 2017-08-18 RX ORDER — FAMOTIDINE 10 MG/ML
INJECTION, SOLUTION INTRAVENOUS AS NEEDED
Status: DISCONTINUED | OUTPATIENT
Start: 2017-08-18 | End: 2017-08-18 | Stop reason: SURG

## 2017-08-18 RX ORDER — SODIUM CHLORIDE, SODIUM LACTATE, POTASSIUM CHLORIDE, CALCIUM CHLORIDE 600; 310; 30; 20 MG/100ML; MG/100ML; MG/100ML; MG/100ML
125 INJECTION, SOLUTION INTRAVENOUS CONTINUOUS
Status: DISCONTINUED | OUTPATIENT
Start: 2017-08-18 | End: 2017-08-20 | Stop reason: HOSPADM

## 2017-08-18 RX ORDER — ONDANSETRON 2 MG/ML
INJECTION INTRAMUSCULAR; INTRAVENOUS AS NEEDED
Status: DISCONTINUED | OUTPATIENT
Start: 2017-08-18 | End: 2017-08-18 | Stop reason: SURG

## 2017-08-18 RX ORDER — OXYTOCIN/RINGER'S LACTATE 20/1000 ML
125 PLASTIC BAG, INJECTION (ML) INTRAVENOUS CONTINUOUS PRN
Status: DISCONTINUED | OUTPATIENT
Start: 2017-08-18 | End: 2017-08-18 | Stop reason: HOSPADM

## 2017-08-18 RX ORDER — IBUPROFEN 600 MG/1
600 TABLET ORAL EVERY 6 HOURS PRN
Status: DISCONTINUED | OUTPATIENT
Start: 2017-08-18 | End: 2017-08-20 | Stop reason: HOSPADM

## 2017-08-18 RX ORDER — DIPHENHYDRAMINE HCL 25 MG
25 CAPSULE ORAL EVERY 4 HOURS PRN
Status: DISCONTINUED | OUTPATIENT
Start: 2017-08-18 | End: 2017-08-20 | Stop reason: HOSPADM

## 2017-08-18 RX ORDER — MORPHINE SULFATE 0.5 MG/ML
INJECTION, SOLUTION EPIDURAL; INTRATHECAL; INTRAVENOUS AS NEEDED
Status: DISCONTINUED | OUTPATIENT
Start: 2017-08-18 | End: 2017-08-18 | Stop reason: SURG

## 2017-08-18 RX ORDER — LIDOCAINE HYDROCHLORIDE 10 MG/ML
5 INJECTION, SOLUTION INFILTRATION; PERINEURAL AS NEEDED
Status: DISCONTINUED | OUTPATIENT
Start: 2017-08-18 | End: 2017-08-18 | Stop reason: HOSPADM

## 2017-08-18 RX ORDER — CEFAZOLIN SODIUM 2 G/100ML
2 INJECTION, SOLUTION INTRAVENOUS EVERY 8 HOURS
Status: DISCONTINUED | OUTPATIENT
Start: 2017-08-18 | End: 2017-08-18

## 2017-08-18 RX ORDER — MORPHINE SULFATE 4 MG/ML
2 INJECTION, SOLUTION INTRAMUSCULAR; INTRAVENOUS
Status: DISCONTINUED | OUTPATIENT
Start: 2017-08-18 | End: 2017-08-18 | Stop reason: HOSPADM

## 2017-08-18 RX ORDER — DIPHENHYDRAMINE HYDROCHLORIDE 50 MG/ML
25 INJECTION INTRAMUSCULAR; INTRAVENOUS EVERY 4 HOURS PRN
Status: DISCONTINUED | OUTPATIENT
Start: 2017-08-18 | End: 2017-08-20 | Stop reason: HOSPADM

## 2017-08-18 RX ORDER — CALCIUM CARBONATE 200(500)MG
2 TABLET,CHEWABLE ORAL EVERY 6 HOURS PRN
Status: DISCONTINUED | OUTPATIENT
Start: 2017-08-18 | End: 2017-08-20 | Stop reason: HOSPADM

## 2017-08-18 RX ORDER — NALOXONE HCL 0.4 MG/ML
0.4 VIAL (ML) INJECTION
Status: ACTIVE | OUTPATIENT
Start: 2017-08-18 | End: 2017-08-19

## 2017-08-18 RX ORDER — DIPHENHYDRAMINE HYDROCHLORIDE 50 MG/ML
INJECTION INTRAMUSCULAR; INTRAVENOUS AS NEEDED
Status: DISCONTINUED | OUTPATIENT
Start: 2017-08-18 | End: 2017-08-18 | Stop reason: SURG

## 2017-08-18 RX ORDER — SODIUM CHLORIDE, SODIUM LACTATE, POTASSIUM CHLORIDE, CALCIUM CHLORIDE 600; 310; 30; 20 MG/100ML; MG/100ML; MG/100ML; MG/100ML
125 INJECTION, SOLUTION INTRAVENOUS CONTINUOUS
Status: DISCONTINUED | OUTPATIENT
Start: 2017-08-18 | End: 2017-08-18

## 2017-08-18 RX ORDER — MISOPROSTOL 200 UG/1
800 TABLET ORAL AS NEEDED
Status: DISCONTINUED | OUTPATIENT
Start: 2017-08-18 | End: 2017-08-18 | Stop reason: HOSPADM

## 2017-08-18 RX ORDER — METHYLERGONOVINE MALEATE 0.2 MG/ML
200 INJECTION INTRAVENOUS AS NEEDED
Status: DISCONTINUED | OUTPATIENT
Start: 2017-08-18 | End: 2017-08-18 | Stop reason: HOSPADM

## 2017-08-18 RX ORDER — CARBOPROST TROMETHAMINE 250 UG/ML
250 INJECTION, SOLUTION INTRAMUSCULAR AS NEEDED
Status: DISCONTINUED | OUTPATIENT
Start: 2017-08-18 | End: 2017-08-18 | Stop reason: HOSPADM

## 2017-08-18 RX ORDER — LANOLIN 100 %
OINTMENT (GRAM) TOPICAL
Status: DISCONTINUED | OUTPATIENT
Start: 2017-08-18 | End: 2017-08-20 | Stop reason: HOSPADM

## 2017-08-18 RX ORDER — OXYTOCIN 10 [USP'U]/ML
INJECTION, SOLUTION INTRAMUSCULAR; INTRAVENOUS AS NEEDED
Status: DISCONTINUED | OUTPATIENT
Start: 2017-08-18 | End: 2017-08-18 | Stop reason: SURG

## 2017-08-18 RX ORDER — SIMETHICONE 80 MG
80 TABLET,CHEWABLE ORAL 4 TIMES DAILY PRN
Status: DISCONTINUED | OUTPATIENT
Start: 2017-08-18 | End: 2017-08-20 | Stop reason: HOSPADM

## 2017-08-18 RX ORDER — MIDAZOLAM HYDROCHLORIDE 1 MG/ML
INJECTION INTRAMUSCULAR; INTRAVENOUS AS NEEDED
Status: DISCONTINUED | OUTPATIENT
Start: 2017-08-18 | End: 2017-08-18 | Stop reason: SURG

## 2017-08-18 RX ORDER — DIPHENHYDRAMINE HCL 25 MG
25 CAPSULE ORAL NIGHTLY PRN
Status: DISCONTINUED | OUTPATIENT
Start: 2017-08-18 | End: 2017-08-18 | Stop reason: HOSPADM

## 2017-08-18 RX ORDER — OXYCODONE HYDROCHLORIDE AND ACETAMINOPHEN 5; 325 MG/1; MG/1
2 TABLET ORAL EVERY 4 HOURS PRN
Status: DISCONTINUED | OUTPATIENT
Start: 2017-08-18 | End: 2017-08-20 | Stop reason: HOSPADM

## 2017-08-18 RX ORDER — KETOROLAC TROMETHAMINE 30 MG/ML
INJECTION, SOLUTION INTRAMUSCULAR; INTRAVENOUS AS NEEDED
Status: DISCONTINUED | OUTPATIENT
Start: 2017-08-18 | End: 2017-08-18 | Stop reason: SURG

## 2017-08-18 RX ORDER — TRISODIUM CITRATE DIHYDRATE AND CITRIC ACID MONOHYDRATE 500; 334 MG/5ML; MG/5ML
30 SOLUTION ORAL ONCE
Status: COMPLETED | OUTPATIENT
Start: 2017-08-18 | End: 2017-08-18

## 2017-08-18 RX ORDER — DIPHENHYDRAMINE HCL 25 MG
25 CAPSULE ORAL EVERY 4 HOURS PRN
Status: DISCONTINUED | OUTPATIENT
Start: 2017-08-18 | End: 2017-08-18 | Stop reason: SDUPTHER

## 2017-08-18 RX ORDER — ONDANSETRON 2 MG/ML
4 INJECTION INTRAMUSCULAR; INTRAVENOUS ONCE AS NEEDED
Status: DISCONTINUED | OUTPATIENT
Start: 2017-08-18 | End: 2017-08-18 | Stop reason: HOSPADM

## 2017-08-18 RX ORDER — OXYTOCIN/RINGER'S LACTATE 20/1000 ML
999 PLASTIC BAG, INJECTION (ML) INTRAVENOUS ONCE
Status: DISCONTINUED | OUTPATIENT
Start: 2017-08-18 | End: 2017-08-18 | Stop reason: HOSPADM

## 2017-08-18 RX ORDER — FENTANYL CITRATE 50 UG/ML
INJECTION, SOLUTION INTRAMUSCULAR; INTRAVENOUS AS NEEDED
Status: DISCONTINUED | OUTPATIENT
Start: 2017-08-18 | End: 2017-08-18 | Stop reason: SURG

## 2017-08-18 RX ORDER — DIPHENHYDRAMINE HYDROCHLORIDE 50 MG/ML
25 INJECTION INTRAMUSCULAR; INTRAVENOUS NIGHTLY PRN
Status: DISCONTINUED | OUTPATIENT
Start: 2017-08-18 | End: 2017-08-18 | Stop reason: HOSPADM

## 2017-08-18 RX ORDER — ONDANSETRON 2 MG/ML
4 INJECTION INTRAMUSCULAR; INTRAVENOUS EVERY 6 HOURS PRN
Status: DISCONTINUED | OUTPATIENT
Start: 2017-08-18 | End: 2017-08-18 | Stop reason: HOSPADM

## 2017-08-18 RX ORDER — OXYCODONE AND ACETAMINOPHEN 7.5; 325 MG/1; MG/1
1 TABLET ORAL EVERY 4 HOURS PRN
Status: COMPLETED | OUTPATIENT
Start: 2017-08-18 | End: 2017-08-18

## 2017-08-18 RX ORDER — SODIUM CHLORIDE 0.9 % (FLUSH) 0.9 %
1-10 SYRINGE (ML) INJECTION AS NEEDED
Status: DISCONTINUED | OUTPATIENT
Start: 2017-08-18 | End: 2017-08-18 | Stop reason: HOSPADM

## 2017-08-18 RX ADMIN — DIPHENHYDRAMINE HYDROCHLORIDE 25 MG: 50 INJECTION INTRAMUSCULAR; INTRAVENOUS at 14:33

## 2017-08-18 RX ADMIN — EPHEDRINE SULFATE 10 MG: 50 INJECTION INTRAMUSCULAR; INTRAVENOUS; SUBCUTANEOUS at 13:53

## 2017-08-18 RX ADMIN — Medication: at 20:20

## 2017-08-18 RX ADMIN — PHENYLEPHRINE HYDROCHLORIDE 100 MCG: 10 INJECTION INTRAVENOUS at 13:51

## 2017-08-18 RX ADMIN — BUPIVACAINE HYDROCHLORIDE 1.6 ML: 7.5 INJECTION, SOLUTION EPIDURAL; RETROBULBAR at 13:40

## 2017-08-18 RX ADMIN — SIMETHICONE 80 MG: 80 TABLET, CHEWABLE ORAL at 18:01

## 2017-08-18 RX ADMIN — DOCUSATE SODIUM 100 MG: 100 CAPSULE, LIQUID FILLED ORAL at 18:01

## 2017-08-18 RX ADMIN — OXYCODONE HYDROCHLORIDE AND ACETAMINOPHEN 1 TABLET: 7.5; 325 TABLET ORAL at 15:54

## 2017-08-18 RX ADMIN — MIDAZOLAM HYDROCHLORIDE 1 MG: 1 INJECTION, SOLUTION INTRAMUSCULAR; INTRAVENOUS at 13:26

## 2017-08-18 RX ADMIN — FAMOTIDINE 20 MG: 10 INJECTION, SOLUTION INTRAVENOUS at 13:26

## 2017-08-18 RX ADMIN — PHENYLEPHRINE HYDROCHLORIDE 100 MCG: 10 INJECTION INTRAVENOUS at 13:44

## 2017-08-18 RX ADMIN — EPHEDRINE SULFATE 10 MG: 50 INJECTION INTRAMUSCULAR; INTRAVENOUS; SUBCUTANEOUS at 13:48

## 2017-08-18 RX ADMIN — MORPHINE SULFATE 0.15 MG: 0.5 INJECTION, SOLUTION EPIDURAL; INTRATHECAL; INTRAVENOUS at 13:40

## 2017-08-18 RX ADMIN — CEFAZOLIN SODIUM 2 G: 2 INJECTION, SOLUTION INTRAVENOUS at 13:10

## 2017-08-18 RX ADMIN — SODIUM CHLORIDE, POTASSIUM CHLORIDE, SODIUM LACTATE AND CALCIUM CHLORIDE 125 ML/HR: 600; 310; 30; 20 INJECTION, SOLUTION INTRAVENOUS at 11:20

## 2017-08-18 RX ADMIN — SODIUM CHLORIDE, POTASSIUM CHLORIDE, SODIUM LACTATE AND CALCIUM CHLORIDE 125 ML/HR: 600; 310; 30; 20 INJECTION, SOLUTION INTRAVENOUS at 20:21

## 2017-08-18 RX ADMIN — FENTANYL CITRATE 15 MCG: 50 INJECTION, SOLUTION INTRAMUSCULAR; INTRAVENOUS at 13:40

## 2017-08-18 RX ADMIN — SODIUM CHLORIDE, POTASSIUM CHLORIDE, SODIUM LACTATE AND CALCIUM CHLORIDE 2000 ML/HR: 600; 310; 30; 20 INJECTION, SOLUTION INTRAVENOUS at 13:10

## 2017-08-18 RX ADMIN — KETOROLAC TROMETHAMINE 30 MG: 30 INJECTION, SOLUTION INTRAMUSCULAR at 14:54

## 2017-08-18 RX ADMIN — SODIUM CHLORIDE, POTASSIUM CHLORIDE, SODIUM LACTATE AND CALCIUM CHLORIDE: 600; 310; 30; 20 INJECTION, SOLUTION INTRAVENOUS at 13:41

## 2017-08-18 RX ADMIN — OXYCODONE AND ACETAMINOPHEN 2 TABLET: 5; 325 TABLET ORAL at 20:03

## 2017-08-18 RX ADMIN — SODIUM CITRATE AND CITRIC ACID MONOHYDRATE 30 ML: 500; 334 SOLUTION ORAL at 13:10

## 2017-08-18 RX ADMIN — Medication 125 ML/HR: at 15:55

## 2017-08-18 RX ADMIN — ONDANSETRON 4 MG: 2 INJECTION INTRAMUSCULAR; INTRAVENOUS at 13:26

## 2017-08-18 RX ADMIN — OXYTOCIN 30 UNITS: 10 INJECTION, SOLUTION INTRAMUSCULAR; INTRAVENOUS at 14:02

## 2017-08-18 NOTE — PLAN OF CARE
Problem: Patient Care Overview (Adult)  Goal: Plan of Care Review  Outcome: Ongoing (interventions implemented as appropriate)    08/18/17 5158   Coping/Psychosocial Response Interventions   Plan Of Care Reviewed With patient;spouse;family   Patient Care Overview   Progress progress toward functional goals as expected   Outcome Evaluation   Outcome Summary/Follow up Plan Breastfeed on demand and may give formula if baby is still rooting after breastfeeding.

## 2017-08-18 NOTE — OP NOTE
Section with Bilateral Tubal Ligation Procedure Note    Larissa Jones    2015     Indications: 1. IUP at 39w4d   2. Desires permanent sterilization      3. Prior CS x 2     4. GDM: diet controlled    Pre-operative Diagnosis: 39w4d    Post-operative Diagnosis: same    Findings:    Birth Information  YOB: 2015   Time of birth: 1:59 PM   Delivering clinician: Kaela Blue   Sex: male   Delivery type: , Low Transverse   Breech type (if applicable):     Observed anomalies/comments:         Estimated Blood Loss:  1000           Drains: [place]                 Specimens: Tubal segements               Complications:  None; patient tolerated the procedure well.           Disposition: PACU - hemodynamically stable.           Condition: stable    Surgeon: Kaela Blue MD     Assistants: Dr. Lim    Anesthesia: Spinal anesthesia    ASA Class: 3    Procedure Details   The patient was seen in the Holding Room. The risks, benefits, complications, treatment options, and expected outcomes were discussed with the patient.  The patient concurred with the proposed plan, giving informed consent.  The site of surgery properly noted/marked. The patient was taken to the Operating Room, identified as Larissa Jones and the procedure verified as  Delivery. A Time Out was held and the above information confirmed.    After induction of anesthesia, the patient was draped and prepped in the usual sterile manner. A Pfannenstiel incision was made and carried down through the subcutaneous tissue to the fascia. The fascial incision was made and extended transversely. The fascia was  from the underlying rectus tissue superiorly and inferiorly. The peritoneum was identified and entered. The peritoneal incision was extended longitudinally.  A low transverse uterine incision was made. Delivered from vertex presentation was a [place] Measurements  Weight (oz):      Length (in):      Head  circumference (in):      Chest circumference (in):      with apgars scores of         APGARS  One minute Five minutes Ten minutes Fifteen minutes Twenty minutes   Skin color: 0   1             Heart rate: 2   2             Grimace: 2   2              Muscle tone: 2   2              Breathin   2              Totals: 8   9              . After the umbilical cord was clamped and cut cord blood was obtained for evaluation. The placenta was removed intact and appeared normal. The uterine outline, tubes and ovaries appeared normal. The uterine incision was closed with running locked sutures of 1 Vicryl and an imbricating layer of 1 Vicryl. Hemostasis was observed. The right fallopian tube was then elevated with a sayda clamp.  The Bovie was used to create a small window through the mesolapinx at an avascular portion.  A 3-0 plain was used to tie on either side of the sayda clamp, taking care to ensure a 3cm segment of tube was tied off.   The tubal segment was excised and sent to pathology.  The same procedure was repeated on the left fallopian tube.  The 3cm segment of tube was sent to pathology.  Endosalpinx was noted on both cut surfaces of both fallopian tubes.  The uterus was replaced into the peritoneal cavity and the tubal sites as well as the hysterotomy was noted to be hemostatic.  The fascia was then reapproximated with running sutures of 1 PDS stratafix. The skin was reapproximated with 4-0 monocryl and prineo dressing with skin glue.    Instrument, sponge, and needle counts were correct prior the abdominal closure and at the conclusion of the case.         Kaela Blue MD  2:44 PM  2017

## 2017-08-18 NOTE — LACTATION NOTE
08/18/17 1700   Maternal Information   Person Making Referral other (see comments)  (Courtesy visit.)   Maternal Infant Assessment   Size Issue, Bilateral Breasts yes  (Patient is aware after that breasts do not make milk.)   Shape, Bilateral Breasts tubular;wide  (Did not make milk with first 2 children.)   Density, Bilateral Breasts soft   Nipples, Bilateral everted   Nipple Conditions, Bilateral intact

## 2017-08-18 NOTE — PLAN OF CARE
Problem: Patient Care Overview (Adult)  Goal: Plan of Care Review  Outcome: Ongoing (interventions implemented as appropriate)    17 1609   Coping/Psychosocial Response Interventions   Plan Of Care Reviewed With patient;spouse   Patient Care Overview   Progress improving       Goal: Adult Individualization and Mutuality  Outcome: Ongoing (interventions implemented as appropriate)  Goal: Discharge Needs Assessment  Outcome: Ongoing (interventions implemented as appropriate)    17 1609   Discharge Needs Assessment   Concerns To Be Addressed denies needs/concerns at this time         Problem:  Delivery (Adult,Obstetrics,Pediatric)  Goal: Signs and Symptoms of Listed Potential Problems Will be Absent or Manageable ( Delivery)  Outcome: Outcome(s) achieved Date Met:  17 1609    Delivery   Problems Assessed ( Delivery) all   Problems Present ( Delivery) none

## 2017-08-18 NOTE — ANESTHESIA PREPROCEDURE EVALUATION
Anesthesia Evaluation     Patient summary reviewed and Nursing notes reviewed   no history of anesthetic complications:  NPO Solid Status: > 8 hours  NPO Liquid Status: > 8 hours     Airway   Mallampati: II  TM distance: >3 FB  Neck ROM: full  possible difficult intubation  Dental      Pulmonary - negative pulmonary ROS   Cardiovascular       ROS comment: H/O ASD - surgically repaired    Neuro/Psych  (+) headaches (Migraine hx),    GI/Hepatic/Renal/Endo    (+) obesity,  GERD, diabetes mellitus gestational well controlled,     Musculoskeletal         ROS comment: Arthritis  Abdominal    Substance History      OB/GYN    (+) Pregnant,         Other   (+) arthritis                                     Anesthesia Plan    ASA 3     spinal and ITN     Anesthetic plan and risks discussed with patient.

## 2017-08-18 NOTE — L&D DELIVERY NOTE
Section with Bilateral Tubal Ligation Procedure Note    Larissa Jones    2017     Indications: 1. IUP at 39w4d   2. Desires permanent sterilization      3. Prior CS x 2     4. GDM: diet controlled    Pre-operative Diagnosis: 39w4d    Post-operative Diagnosis: same    Findings:    Birth Information  YOB: 2015   Time of birth: 1:59 PM   Delivering clinician: Kaela Blue   Sex: male   Delivery type: , Low Transverse   Breech type (if applicable):     Observed anomalies/comments:         Estimated Blood Loss:  1000           Drains: [place]                 Specimens: Tubal segements               Complications:  None; patient tolerated the procedure well.           Disposition: PACU - hemodynamically stable.           Condition: stable    Surgeon: Kaela Blue MD     Assistants: Dr. Lim    Anesthesia: Spinal anesthesia    ASA Class: 3    Procedure Details   The patient was seen in the Holding Room. The risks, benefits, complications, treatment options, and expected outcomes were discussed with the patient.  The patient concurred with the proposed plan, giving informed consent.  The site of surgery properly noted/marked. The patient was taken to the Operating Room, identified as Larissa Jones and the procedure verified as  Delivery. A Time Out was held and the above information confirmed.    After induction of anesthesia, the patient was draped and prepped in the usual sterile manner. A Pfannenstiel incision was made and carried down through the subcutaneous tissue to the fascia. The fascial incision was made and extended transversely. The fascia was  from the underlying rectus tissue superiorly and inferiorly. The peritoneum was identified and entered. The peritoneal incision was extended longitudinally.  A low transverse uterine incision was made. Delivered from vertex presentation was a [place] Measurements  Weight (oz):      Length (in):      Head  circumference (in):      Chest circumference (in):      with apgars scores of         APGARS  One minute Five minutes Ten minutes Fifteen minutes Twenty minutes   Skin color: 0   1             Heart rate: 2   2             Grimace: 2   2              Muscle tone: 2   2              Breathin   2              Totals: 8   9              . After the umbilical cord was clamped and cut cord blood was obtained for evaluation. The placenta was removed intact and appeared normal. The uterine outline, tubes and ovaries appeared normal. The uterine incision was closed with running locked sutures of 1 Vicryl and an imbricating layer of 1 Vicryl. Hemostasis was observed. The right fallopian tube was then elevated with a sayda clamp.  The Bovie was used to create a small window through the mesolapinx at an avascular portion.  A 3-0 plain was used to tie on either side of the sayda clamp, taking care to ensure a 3cm segment of tube was tied off.   The tubal segment was excised and sent to pathology.  The same procedure was repeated on the left fallopian tube.  The 3cm segment of tube was sent to pathology.  Endosalpinx was noted on both cut surfaces of both fallopian tubes.  The uterus was replaced into the peritoneal cavity and the tubal sites as well as the hysterotomy was noted to be hemostatic.  The fascia was then reapproximated with running sutures of 1 PDS stratafix. The skin was reapproximated with 4-0 monocryl and prineo dressing with skin glue.    Instrument, sponge, and needle counts were correct prior the abdominal closure and at the conclusion of the case.         Kaela Blue MD  2:44 PM  2017

## 2017-08-18 NOTE — ANESTHESIA POSTPROCEDURE EVALUATION
Patient: Larissa Jones    Procedure Summary     Date Anesthesia Start Anesthesia Stop Room / Location    17 1325 1445 [facility]  /  [facility]       Procedure Diagnosis Surgeon Provider     SECTION REPEAT WITH TUBAL (Bilateral Abdomen) No diagnosis on file. MD Isidro Guevara MD          Anesthesia Type: spinal, ITN  Last vitals  BP     106/52   Temp   97.5 °F (36.4 °C) (17 1445)    Pulse 80   Resp   16 (17 1445)    SpO2 98%     Post Anesthesia Care and Evaluation    Patient location during evaluation: bedside  Patient participation: complete - patient participated  Level of consciousness: awake and alert  Pain score: 0  Pain management: adequate  Airway patency: patent  Anesthetic complications: No anesthetic complications    Cardiovascular status: acceptable  Respiratory status: acceptable  Hydration status: acceptable

## 2017-08-18 NOTE — LACTATION NOTE
Gave BF info.  Patient has cone shaped, wide spaced breast and did not make milk with first two children.  States she has been told by other LC's at [facility] and [facility] that she has breast not particularly compatible with breastfeeding.  Discussed that N/NN would be a good plan with this baby.

## 2017-08-18 NOTE — H&P
[facility]  Obstetric History and Physical    Chief Complaint   Patient presents with   • Scheduled        Subjective     Patient is a 41 y.o. female  currently at 39w4d, who presents for scheduled  section.    Her prenatal care is complicated by  prior   x 2 and diet controlled GDM.  Her previous obstetric/gynecological history is noted for is non-contributory.    The following portions of the patients history were reviewed and updated as appropriate: current medications, allergies, past medical history, past surgical history, past family history, past social history and problem list .       Prenatal Information:   Maternal Prenatal Labs  Blood Type ABO Type   Date Value Ref Range Status   2017 O  Final      Rh Status RH type   Date Value Ref Range Status   2017 Positive  Final      Antibody Screen Antibody Screen   Date Value Ref Range Status   2017 Negative  Final                    Past OB History:       Obstetric History       T2      TAB1   SAB0   E0   M0   L2       # Outcome Date GA Lbr Victorino/2nd Weight Sex Delivery Anes PTL Lv   4 Current            3 Term 09 39w0d  9 lb 14 oz (4.479 kg) F CS-Unspec   Y   2 Term 07 39w6d  6 lb 10 oz (3.005 kg) F CS-Unspec   Y      Complications: Breech presentation   1 TAB 1997 6w0d                 Past Medical History: Past Medical History:   Diagnosis Date   • Acid reflux    • Arthritis    • ASD (atrial septal defect)     Surgically repaired   • ASD (atrial septal defect)    • Gestational diabetes    • Migraines    • Psoriasis       Past Surgical History Past Surgical History:   Procedure Laterality Date   • CARDIAC SURGERY      ASD repair   •  SECTION     • KNEE ARTHROSCOPY Right     , ,    • LAPAROSCOPIC CHOLECYSTECTOMY     • ORIF ELBOW FRACTURE Left    • ORIF FOOT FRACTURE Left       Family History: Family History   Problem Relation Age of Onset   • Stroke  Mother    • Thyroid disease Mother    • Arthritis Father    • Heart attack Father    • Hyperlipidemia Father    • Breast cancer Maternal Grandmother       Social History:  reports that she has never smoked. She has never used smokeless tobacco.   reports that she does not drink alcohol.   reports that she does not use illicit drugs.        REVIEW OF SYSTEMS              Reports fetal movement is normal             Denies leakage of amniotic fluid.             Denies vaginal bleeding             She reports No contractions             All other systems reviewed and are negative    Objective       Vital Signs Range for the last 24 hours  Temperature: Temp:  [98 °F (36.7 °C)] 98 °F (36.7 °C)   Temp Source: Temp src: Oral   BP: BP: (125)/(79) 125/79   Pulse: Heart Rate:  [95] 95   Respirations: Resp:  [16] 16   SPO2:     O2 Amount (l/min):     O2 Devices     Weight: Weight:  [277 lb (126 kg)-277 lb 12.5 oz (126 kg)] 277 lb (126 kg)         Fetal Heart Rate Assessment   Method: Fetal HR Assessment Method: external   Beats/min: Fetal HR (Beats/Min): 145   Baseline: Fetal HR Baseline: normal range (110-160 bpm)   Varibility: Fetal HR Variability: moderate (amplitude range 6 to 25 bpm)   Accels: Fetal HR Accelerations: greater than/equal to 15 bpm, lasting at least 15 seconds   Decels: Fetal HR Decelerations: absent   Tracing Category:      NST: REACTIVE     Uterine Assessment : quiet                  Constitutional:  Well developed, well nourished, no acute distress, well-groomed.   Respiratory:  Lungs are clear to auscultation bilaterally, normal breath sounds.   Cardiovascular:  Normal rate and rhythm, no murmurs.   Gastrointestinal:  Soft, gravid, nontender.  Uterus: Soft, nontender. Fundus appropriate for dates.  Neurologic:  Alert & oriented x 3,  no focal deficits noted.   Psychiatric:  Speech and behavior appropriate.   Extremities: no cyanosis, clubbing or edema, no evidence of DVT.        Labs:  Lab Results    Component Value Date    WBC 9.89 2017    HGB 11.6 2017    HCT 34.3 (L) 2017    MCV 88.2 2017     2017    URICACID 4.0 2017    AST 23 2017    ALT 25 2017     2017       Results from last 7 days  Lab Units 17  1435   ABO TYPING  O   RH TYPING  Positive   ANTIBODY SCREEN  Negative           Assessment/Plan     Active Problems:    Term pregnancy        Assessment:  1.  Intrauterine pregnancy at 39w4d weeks gestation with reactive fetal status.    2.   scheduled  section  3.  Obstetrical history significant for prior CS x 2.  4.  GBS status: positive  5.  Desires permanent sterilization  6.  GDM: diet controlled    Plan:  1. with BTL  2. Plan of care has been reviewed with patient and questions answered.  3.  Risks, benefits of treatment plan have been discussed.  4.  All questions have been answered.        Kaela Blue MD  2017  12:19 PM

## 2017-08-18 NOTE — ANESTHESIA PROCEDURE NOTES
Spinal Block    Indication:procedure for pain  Performed By  Anesthesiologist: KOBI PHILLIPS  CRNA: MATILDE ABARCA  Preanesthetic Checklist  Completed: patient identified, surgical consent, pre-op evaluation, timeout performed, IV checked, risks and benefits discussed and monitors and equipment checked  Spinal Block Prep:  Patient Position:sitting  Sterile Tech:cap, gloves, mask and sterile barriers  Prep:Betadine  Patient Monitoring:blood pressure monitoring, continuous pulse oximetry and EKG  Spinal Block Procedure  Approach:midline  Guidance:palpation technique  Location:L3-L4  Needle Type:Aruna  Needle Gauge:25 G  Paresthesia: no  Fluid Appearance:clear  Post Assessment  Patient Tolerance:patient tolerated the procedure well with no apparent complications  Complications no

## 2017-08-19 LAB
BASOPHILS # BLD AUTO: 0.01 10*3/MM3 (ref 0–0.2)
BASOPHILS NFR BLD AUTO: 0.1 % (ref 0–1)
DEPRECATED RDW RBC AUTO: 44.2 FL (ref 37–54)
EOSINOPHIL # BLD AUTO: 0.1 10*3/MM3 (ref 0–0.3)
EOSINOPHIL NFR BLD AUTO: 1.2 % (ref 0–3)
ERYTHROCYTE [DISTWIDTH] IN BLOOD BY AUTOMATED COUNT: 13.4 % (ref 11.3–14.5)
HCT VFR BLD AUTO: 28 % (ref 34.5–44)
HGB BLD-MCNC: 9.2 G/DL (ref 11.5–15.5)
IMM GRANULOCYTES # BLD: 0.03 10*3/MM3 (ref 0–0.03)
IMM GRANULOCYTES NFR BLD: 0.3 % (ref 0–0.6)
LYMPHOCYTES # BLD AUTO: 1.08 10*3/MM3 (ref 0.6–4.8)
LYMPHOCYTES NFR BLD AUTO: 12.6 % (ref 24–44)
MCH RBC QN AUTO: 29.3 PG (ref 27–31)
MCHC RBC AUTO-ENTMCNC: 32.9 G/DL (ref 32–36)
MCV RBC AUTO: 89.2 FL (ref 80–99)
MONOCYTES # BLD AUTO: 0.64 10*3/MM3 (ref 0–1)
MONOCYTES NFR BLD AUTO: 7.5 % (ref 0–12)
NEUTROPHILS # BLD AUTO: 6.72 10*3/MM3 (ref 1.5–8.3)
NEUTROPHILS NFR BLD AUTO: 78.3 % (ref 41–71)
PLATELET # BLD AUTO: 174 10*3/MM3 (ref 150–450)
PMV BLD AUTO: 9.1 FL (ref 6–12)
RBC # BLD AUTO: 3.14 10*6/MM3 (ref 3.89–5.14)
WBC NRBC COR # BLD: 8.58 10*3/MM3 (ref 3.5–10.8)

## 2017-08-19 PROCEDURE — 85025 COMPLETE CBC W/AUTO DIFF WBC: CPT | Performed by: OBSTETRICS & GYNECOLOGY

## 2017-08-19 RX ORDER — FERROUS SULFATE 325(65) MG
325 TABLET ORAL
Status: DISCONTINUED | OUTPATIENT
Start: 2017-08-19 | End: 2017-08-20 | Stop reason: HOSPADM

## 2017-08-19 RX ADMIN — DOCUSATE SODIUM 100 MG: 100 CAPSULE, LIQUID FILLED ORAL at 08:53

## 2017-08-19 RX ADMIN — OXYCODONE AND ACETAMINOPHEN 1 TABLET: 5; 325 TABLET ORAL at 20:17

## 2017-08-19 RX ADMIN — OXYCODONE AND ACETAMINOPHEN 1 TABLET: 5; 325 TABLET ORAL at 02:15

## 2017-08-19 RX ADMIN — OXYCODONE AND ACETAMINOPHEN 1 TABLET: 5; 325 TABLET ORAL at 15:09

## 2017-08-19 RX ADMIN — SIMETHICONE 80 MG: 80 TABLET, CHEWABLE ORAL at 08:54

## 2017-08-19 RX ADMIN — OXYCODONE AND ACETAMINOPHEN 1 TABLET: 5; 325 TABLET ORAL at 09:30

## 2017-08-19 RX ADMIN — PRENATAL VIT W/ FE FUMARATE-FA TAB 27-0.8 MG 1 TABLET: 27-0.8 TAB at 08:54

## 2017-08-19 RX ADMIN — IBUPROFEN 600 MG: 600 TABLET, FILM COATED ORAL at 15:09

## 2017-08-19 RX ADMIN — IBUPROFEN 600 MG: 600 TABLET, FILM COATED ORAL at 08:54

## 2017-08-19 RX ADMIN — DOCUSATE SODIUM 100 MG: 100 CAPSULE, LIQUID FILLED ORAL at 17:48

## 2017-08-19 RX ADMIN — Medication 325 MG: at 17:48

## 2017-08-19 RX ADMIN — IBUPROFEN 600 MG: 600 TABLET, FILM COATED ORAL at 02:15

## 2017-08-19 NOTE — PROGRESS NOTES
2017    Name:Larissa Jones    MR#:[id number]     PROGRESS NOTE:  Post-Op 1 S/P        Subjective   41 y.o. yo Female  s/p CS at 39w4d doing well. Pain well controlled, lochia appropriate    Patient Active Problem List   Diagnosis   • AMA (advanced maternal age) multigravida 35+   • History of  delivery, antepartum   • Family history of congenital heart defect   • Diet controlled gestational diabetes mellitus (GDM) in third trimester   • Status post repeat low transverse  section        Objective    Vitals  Temp:  Temp:  [97.3 °F (36.3 °C)-98.6 °F (37 °C)] 98.1 °F (36.7 °C)  Temp src: Oral  BP:  BP: ()/(29-70) 100/48  Pulse:  Heart Rate:  [61-92] 79  RR:   Resp:  [16-18] 16    General Awake, alert, no distress  Abdomen Soft, non-distended, fundus firm, below umbilicus, appropriately tender  Incision  Intact, no erythema or exudate  Extremities Calves NT bilaterally     I/O last 3 completed shifts:  In: 2525 [I.V.:2400]  Out: 1825 [Urine:825; Blood:1000]    LABS:   Lab Results   Component Value Date    WBC 8.58 2017    HGB 9.2 (L) 2017    HCT 28.0 (L) 2017    MCV 89.2 2017     2017       Infant: male       Assessment   1.  POD 1 from  Section     Plan: Doing well.    D/C IV, advance diet, may shower.  Desires D/C home in AM  Will start FeSO4 2/2 H/H 9.2/28.0          Charissa Guzman MD  2017 11:44 AM

## 2017-08-19 NOTE — PLAN OF CARE
Problem: Patient Care Overview (Adult)  Goal: Plan of Care Review  Outcome: Ongoing (interventions implemented as appropriate)    08/19/17 1220   Coping/Psychosocial Response Interventions   Plan Of Care Reviewed With patient   Patient Care Overview   Progress no change   Outcome Evaluation   Outcome Summary/Follow up Plan Patient indicates infant is breastfeeding well. All breastfeeding indicators look good. However, patient had unsuccessful breastfeeding with first two children and never made milk other than early colostrum. She really doesn't want to pump at this time. We will evaluate infant's weight tonight and discuss need to add pumping tomorrow.

## 2017-08-20 VITALS
DIASTOLIC BLOOD PRESSURE: 59 MMHG | HEART RATE: 81 BPM | BODY MASS INDEX: 41.03 KG/M2 | WEIGHT: 277 LBS | TEMPERATURE: 97.9 F | HEIGHT: 69 IN | OXYGEN SATURATION: 100 % | RESPIRATION RATE: 18 BRPM | SYSTOLIC BLOOD PRESSURE: 117 MMHG

## 2017-08-20 PROBLEM — O09.529 AMA (ADVANCED MATERNAL AGE) MULTIGRAVIDA 35+: Status: RESOLVED | Noted: 2017-03-20 | Resolved: 2017-08-20

## 2017-08-20 RX ORDER — OXYCODONE HYDROCHLORIDE AND ACETAMINOPHEN 5; 325 MG/1; MG/1
2 TABLET ORAL EVERY 4 HOURS PRN
Qty: 30 TABLET | Refills: 0 | Status: SHIPPED | OUTPATIENT
Start: 2017-08-20 | End: 2017-08-28

## 2017-08-20 RX ORDER — IBUPROFEN 600 MG/1
600 TABLET ORAL EVERY 6 HOURS PRN
Qty: 30 TABLET | Refills: 1 | OUTPATIENT
Start: 2017-08-20 | End: 2021-04-29

## 2017-08-20 RX ORDER — DOCUSATE SODIUM 100 MG/1
100 CAPSULE, LIQUID FILLED ORAL 2 TIMES DAILY
Qty: 60 CAPSULE | Refills: 1 | Status: SHIPPED | OUTPATIENT
Start: 2017-08-20 | End: 2018-08-20

## 2017-08-20 RX ORDER — FERROUS SULFATE 325(65) MG
325 TABLET ORAL
Qty: 30 TABLET | Refills: 1 | Status: SHIPPED | OUTPATIENT
Start: 2017-08-20 | End: 2022-10-19

## 2017-08-20 RX ADMIN — IBUPROFEN 600 MG: 600 TABLET, FILM COATED ORAL at 00:06

## 2017-08-20 RX ADMIN — PRENATAL VIT W/ FE FUMARATE-FA TAB 27-0.8 MG 1 TABLET: 27-0.8 TAB at 10:10

## 2017-08-20 RX ADMIN — IBUPROFEN 600 MG: 600 TABLET, FILM COATED ORAL at 06:12

## 2017-08-20 RX ADMIN — Medication 325 MG: at 10:10

## 2017-08-20 RX ADMIN — OXYCODONE AND ACETAMINOPHEN 1 TABLET: 5; 325 TABLET ORAL at 11:47

## 2017-08-20 RX ADMIN — DOCUSATE SODIUM 100 MG: 100 CAPSULE, LIQUID FILLED ORAL at 06:12

## 2017-08-20 RX ADMIN — OXYCODONE AND ACETAMINOPHEN 1 TABLET: 5; 325 TABLET ORAL at 00:05

## 2017-08-20 RX ADMIN — OXYCODONE AND ACETAMINOPHEN 1 TABLET: 5; 325 TABLET ORAL at 06:12

## 2017-08-20 RX ADMIN — IBUPROFEN 600 MG: 600 TABLET, FILM COATED ORAL at 11:48

## 2017-08-20 NOTE — DISCHARGE SUMMARY
[place]   Discharge Summary      Patient: Larissa Jones      MR#:[id number]  Admission  Diagnosis: term pregnancy, prior c/s, desires permanent sterilization  Problem List Items Addressed This Visit     None      Visit Diagnoses     Sterilization        Relevant Orders    Tissue Pathology Exam          Discharge Diagnosis: POD#2 s/p RLTCS/BTL  1. Sterilization        Date of Admission: 2017  Date of Discharge:  2017    Procedures:  , Low Transverse     2017    1:59 PM      Service:  Obstetrics    Hospital Course:  Patient underwent  section and remained in the hospital for 2 days.  During that time she remained afebrile and hemodynamically stable.  On the day of discharge, she was eating, ambulating and voiding without difficulty.      Labs  Lab Results   Component Value Date    WBC 8.58 2017    HGB 9.2 (L) 2017    HCT 28.0 (L) 2017    MCV 89.2 2017     2017    URICACID 4.0 2017    AST 23 2017    ALT 25 2017     2017       Results from last 7 days  Lab Units 17  1435   ABO TYPING  O   RH TYPING  Positive   ANTIBODY SCREEN  Negative       Discharge Medications   Larissa Jones   Home Medication Instructions BRYAN:[id number]    Printed on:17 1047   Medication Information                      Acetone, Urine, Test (KETONE TEST) strip  1 strip by In Vitro route Daily. Test urine ketones daily in am             clobetasol (TEMOVATE) 0.05 % cream               docusate sodium (COLACE) 100 MG capsule  Take 1 capsule by mouth 2 (Two) Times a Day.             ferrous sulfate 325 (65 FE) MG tablet  Take 1 tablet by mouth Daily With Breakfast.             glucose blood (ONETOUCH VERIO) test strip  Test blood sugars QID             ibuprofen (ADVIL,MOTRIN) 600 MG tablet  Take 1 tablet by mouth Every 6 (Six) Hours As Needed for Mild Pain .             ONETOUCH DELICA LANCETS 33G misc  Test blood sugars  QID             oxyCODONE-acetaminophen (PERCOCET) 5-325 MG per tablet  Take 2 tablets by mouth Every 4 (Four) Hours As Needed for Moderate Pain  for up to 8 days.             Prenatal Vit-Fe Fumarate-FA (PRENATAL 27-1) 27-1 MG tablet tablet  Take 1 tablet by mouth Daily.             Urine Glucose-Ketones Test (KETO-DIASTIX) strip                   Discharge Disposition:  To Home    Discharge Condition:  Stable    Discharge Diet: regular    Activity at Discharge: pelvic rest    Follow-up Appointments  2 weeks and 6 weeks    Charissa Guzman MD  08/20/17  10:47 AM

## 2017-08-20 NOTE — PROGRESS NOTES
2017    Name:Larissa Jones    MR#:[id number]     PROGRESS NOTE:  Post-Op 2 S/P        Subjective   41 y.o. yo Female  s/p CS at 39w4d doing well. Pain well controlled, lochia appropriate, tolerating diet.     Patient Active Problem List   Diagnosis   • AMA (advanced maternal age) multigravida 35+   • History of  delivery, antepartum   • Family history of congenital heart defect   • Diet controlled gestational diabetes mellitus (GDM) in third trimester   • Status post repeat low transverse  section        Objective    Vitals  Temp:  Temp:  [97.3 °F (36.3 °C)-98.5 °F (36.9 °C)] 98.2 °F (36.8 °C)  Temp src: Oral  BP:  BP: (101-130)/(54-66) 119/57  Pulse:  Heart Rate:  [82-95] 83  RR:   Resp:  [16-18] 16    General Awake, alert, no distress  Abdomen Soft, non-distended, fundus firm, below umbilicus, appropriately tender  Incision  Intact, no erythema or exudate  Extremities Calves NT bilaterally     I/O last 3 completed shifts:  In: 125   Out: 2825 [Urine:2825]    LABS:   Lab Results   Component Value Date    WBC 8.58 2017    HGB 9.2 (L) 2017    HCT 28.0 (L) 2017    MCV 89.2 2017     2017       Infant: male       Assessment   1.  POD 2 from  Section    Plan: Doing well.    d/c home today.  Infant with hypospadias/spontaneous partial circ - will f/u with peds urology.  Precautions given  rx for colace/percocet/pnv/ibuprofen/FeSO4  RTC in 2 weeks for incision check          Charissa Guzman MD  2017 10:42 AM

## 2017-08-20 NOTE — PLAN OF CARE
Problem: Patient Care Overview (Adult)  Goal: Plan of Care Review  Outcome: Ongoing (interventions implemented as appropriate)    17 0443   Coping/Psychosocial Response Interventions   Plan Of Care Reviewed With patient   Patient Care Overview   Progress improving       Goal: Adult Individualization and Mutuality  Outcome: Ongoing (interventions implemented as appropriate)  Goal: Discharge Needs Assessment  Outcome: Ongoing (interventions implemented as appropriate)    Problem: Breastfeeding (Adult,NICU,,Obstetrics,Pediatric)  Goal: Signs and Symptoms of Listed Potential Problems Will be Absent or Manageable (Breastfeeding)  Outcome: Ongoing (interventions implemented as appropriate)    Problem: Postpartum ( Delivery) (Adult)  Goal: Signs and Symptoms of Listed Potential Problems Will be Absent or Manageable (Postpartum)  Outcome: Ongoing (interventions implemented as appropriate)

## 2017-08-20 NOTE — PLAN OF CARE
Problem: Patient Care Overview (Adult)  Goal: Plan of Care Review  Outcome: Outcome(s) achieved Date Met:  17  Goal: Adult Individualization and Mutuality  Outcome: Outcome(s) achieved Date Met:  17  Goal: Discharge Needs Assessment  Outcome: Outcome(s) achieved Date Met:  17    Problem: Breastfeeding (Adult,NICU,[place],Obstetrics,Pediatric)  Goal: Signs and Symptoms of Listed Potential Problems Will be Absent or Manageable (Breastfeeding)  Outcome: Outcome(s) achieved Date Met:  17    Problem: Postpartum ( Delivery) (Adult)  Goal: Signs and Symptoms of Listed Potential Problems Will be Absent or Manageable (Postpartum)  Outcome: Outcome(s) achieved Date Met:  17 1220   Postpartum ( Delivery)   Problems Assessed (Postpartum ) all   Problems Present (Postpartum ) none

## 2017-08-22 LAB
CYTO UR: NORMAL
LAB AP CASE REPORT: NORMAL
LAB AP CLINICAL INFORMATION: NORMAL
Lab: NORMAL
PATH REPORT.FINAL DX SPEC: NORMAL
PATH REPORT.GROSS SPEC: NORMAL

## 2020-01-07 ENCOUNTER — APPOINTMENT (OUTPATIENT)
Dept: MRI IMAGING | Facility: HOSPITAL | Age: 45
End: 2020-01-07

## 2020-01-07 ENCOUNTER — HOSPITAL ENCOUNTER (EMERGENCY)
Facility: HOSPITAL | Age: 45
Discharge: HOME | End: 2020-01-07
Attending: EMERGENCY MEDICINE | Admitting: EMERGENCY MEDICINE

## 2020-01-07 ENCOUNTER — APPOINTMENT (OUTPATIENT)
Dept: GENERAL RADIOLOGY | Facility: HOSPITAL | Age: 45
End: 2020-01-07

## 2020-01-07 VITALS
HEART RATE: 69 BPM | SYSTOLIC BLOOD PRESSURE: 100 MMHG | WEIGHT: 260 LBS | TEMPERATURE: 98.3 F | RESPIRATION RATE: 18 BRPM | BODY MASS INDEX: 37.22 KG/M2 | DIASTOLIC BLOOD PRESSURE: 51 MMHG | HEIGHT: 70 IN | OXYGEN SATURATION: 98 %

## 2020-01-07 DIAGNOSIS — R42 DIZZINESS: Primary | ICD-10-CM

## 2020-01-07 LAB
ALBUMIN SERPL-MCNC: 3.7 G/DL (ref 3.5–5.2)
ALBUMIN/GLOB SERPL: 1.3 G/DL
ALP SERPL-CCNC: 78 U/L (ref 39–117)
ALT SERPL W P-5'-P-CCNC: 18 U/L (ref 1–33)
ANION GAP SERPL CALCULATED.3IONS-SCNC: 10 MMOL/L (ref 5–15)
AST SERPL-CCNC: 18 U/L (ref 1–32)
B-HCG UR QL: NEGATIVE
BASOPHILS # BLD AUTO: 0.04 10*3/MM3 (ref 0–0.2)
BASOPHILS NFR BLD AUTO: 0.5 % (ref 0–1.5)
BILIRUB SERPL-MCNC: 0.4 MG/DL (ref 0.2–1.2)
BILIRUB UR QL STRIP: NEGATIVE
BUN BLD-MCNC: 10 MG/DL (ref 6–20)
BUN/CREAT SERPL: 16.7 (ref 7–25)
CALCIUM SPEC-SCNC: 8.6 MG/DL (ref 8.6–10.5)
CHLORIDE SERPL-SCNC: 104 MMOL/L (ref 98–107)
CLARITY UR: CLEAR
CO2 SERPL-SCNC: 25 MMOL/L (ref 22–29)
COLOR UR: YELLOW
CREAT BLD-MCNC: 0.6 MG/DL (ref 0.57–1)
DEPRECATED RDW RBC AUTO: 41.4 FL (ref 37–54)
EOSINOPHIL # BLD AUTO: 0.25 10*3/MM3 (ref 0–0.4)
EOSINOPHIL NFR BLD AUTO: 3.2 % (ref 0.3–6.2)
ERYTHROCYTE [DISTWIDTH] IN BLOOD BY AUTOMATED COUNT: 12.4 % (ref 12.3–15.4)
GFR SERPL CREATININE-BSD FRML MDRD: 110 ML/MIN/1.73
GLOBULIN UR ELPH-MCNC: 2.8 GM/DL
GLUCOSE BLD-MCNC: 97 MG/DL (ref 65–99)
GLUCOSE UR STRIP-MCNC: NEGATIVE MG/DL
HCT VFR BLD AUTO: 46.8 % (ref 34–46.6)
HGB BLD-MCNC: 15.2 G/DL (ref 12–15.9)
HGB UR QL STRIP.AUTO: NEGATIVE
HOLD SPECIMEN: NORMAL
HOLD SPECIMEN: NORMAL
IMM GRANULOCYTES # BLD AUTO: 0.02 10*3/MM3 (ref 0–0.05)
IMM GRANULOCYTES NFR BLD AUTO: 0.3 % (ref 0–0.5)
INTERNAL NEGATIVE CONTROL: NEGATIVE
INTERNAL POSITIVE CONTROL: POSITIVE
KETONES UR QL STRIP: NEGATIVE
LEUKOCYTE ESTERASE UR QL STRIP.AUTO: NEGATIVE
LYMPHOCYTES # BLD AUTO: 2.82 10*3/MM3 (ref 0.7–3.1)
LYMPHOCYTES NFR BLD AUTO: 35.6 % (ref 19.6–45.3)
Lab: NORMAL
MAGNESIUM SERPL-MCNC: 1.9 MG/DL (ref 1.6–2.6)
MCH RBC QN AUTO: 29.5 PG (ref 26.6–33)
MCHC RBC AUTO-ENTMCNC: 32.5 G/DL (ref 31.5–35.7)
MCV RBC AUTO: 90.9 FL (ref 79–97)
MONOCYTES # BLD AUTO: 0.5 10*3/MM3 (ref 0.1–0.9)
MONOCYTES NFR BLD AUTO: 6.3 % (ref 5–12)
NEUTROPHILS # BLD AUTO: 4.3 10*3/MM3 (ref 1.7–7)
NEUTROPHILS NFR BLD AUTO: 54.1 % (ref 42.7–76)
NITRITE UR QL STRIP: NEGATIVE
NRBC BLD AUTO-RTO: 0 /100 WBC (ref 0–0.2)
PH UR STRIP.AUTO: 6 [PH] (ref 5–8)
PLATELET # BLD AUTO: 279 10*3/MM3 (ref 140–450)
PMV BLD AUTO: 8.9 FL (ref 6–12)
POTASSIUM BLD-SCNC: 4.6 MMOL/L (ref 3.5–5.2)
PROT SERPL-MCNC: 6.5 G/DL (ref 6–8.5)
PROT UR QL STRIP: NEGATIVE
RBC # BLD AUTO: 5.15 10*6/MM3 (ref 3.77–5.28)
SODIUM BLD-SCNC: 139 MMOL/L (ref 136–145)
SP GR UR STRIP: 1.01 (ref 1–1.03)
TROPONIN T SERPL-MCNC: <0.01 NG/ML (ref 0–0.03)
TROPONIN T SERPL-MCNC: <0.01 NG/ML (ref 0–0.03)
UROBILINOGEN UR QL STRIP: NORMAL
WBC NRBC COR # BLD: 7.93 10*3/MM3 (ref 3.4–10.8)
WHOLE BLOOD HOLD SPECIMEN: NORMAL
WHOLE BLOOD HOLD SPECIMEN: NORMAL

## 2020-01-07 PROCEDURE — 84484 ASSAY OF TROPONIN QUANT: CPT | Performed by: EMERGENCY MEDICINE

## 2020-01-07 PROCEDURE — 83735 ASSAY OF MAGNESIUM: CPT | Performed by: EMERGENCY MEDICINE

## 2020-01-07 PROCEDURE — 81025 URINE PREGNANCY TEST: CPT | Performed by: EMERGENCY MEDICINE

## 2020-01-07 PROCEDURE — 71045 X-RAY EXAM CHEST 1 VIEW: CPT

## 2020-01-07 PROCEDURE — 70551 MRI BRAIN STEM W/O DYE: CPT

## 2020-01-07 PROCEDURE — 36415 COLL VENOUS BLD VENIPUNCTURE: CPT

## 2020-01-07 PROCEDURE — 99285 EMERGENCY DEPT VISIT HI MDM: CPT

## 2020-01-07 PROCEDURE — 80053 COMPREHEN METABOLIC PANEL: CPT | Performed by: EMERGENCY MEDICINE

## 2020-01-07 PROCEDURE — 93005 ELECTROCARDIOGRAM TRACING: CPT | Performed by: EMERGENCY MEDICINE

## 2020-01-07 PROCEDURE — 85025 COMPLETE CBC W/AUTO DIFF WBC: CPT | Performed by: EMERGENCY MEDICINE

## 2020-01-07 PROCEDURE — 81003 URINALYSIS AUTO W/O SCOPE: CPT | Performed by: EMERGENCY MEDICINE

## 2020-01-07 RX ORDER — MECLIZINE HYDROCHLORIDE 25 MG/1
25 TABLET ORAL ONCE
Status: COMPLETED | OUTPATIENT
Start: 2020-01-07 | End: 2020-01-07

## 2020-01-07 RX ORDER — MECLIZINE HYDROCHLORIDE 25 MG/1
25 TABLET ORAL 3 TIMES DAILY PRN
Qty: 20 TABLET | Refills: 0 | Status: SHIPPED | OUTPATIENT
Start: 2020-01-07 | End: 2022-10-19

## 2020-01-07 RX ORDER — SODIUM CHLORIDE 0.9 % (FLUSH) 0.9 %
10 SYRINGE (ML) INJECTION AS NEEDED
Status: DISCONTINUED | OUTPATIENT
Start: 2020-01-07 | End: 2020-01-07 | Stop reason: HOSPADM

## 2020-01-07 RX ADMIN — MECLIZINE HYDROCHLORIDE 25 MG: 25 TABLET ORAL at 08:27

## 2020-01-07 RX ADMIN — SODIUM CHLORIDE 500 ML: 9 INJECTION, SOLUTION INTRAVENOUS at 08:26

## 2020-01-07 NOTE — DISCHARGE INSTRUCTIONS
Follow up with your primary care physician in one week. Return to the ER with any new or worsening symptoms.

## 2020-01-07 NOTE — ED PROVIDER NOTES
"Subjective   44-year-old female presents for evaluation of dizziness.  Of note, the patient states that she suffers from dizzy spells from time to time that seem to be brought on when she gets up \"too fast.\"  These episodes typically are quite transient and resolve spontaneously.  This morning, she states that approximately 2 hours ago while in the shower she began experiencing dizziness and a sensation of \"being out of it\" and \"feeling off\" that has persisted since that time.  She denies any accompanying room spinning sensation or vision changes.  No focal weakness.  She states that she has felt some tingling in both hands intermittently since yesterday.  Her dizziness seems to be worse with movement and improved by sitting still.  She is unsure as to what may have triggered her symptoms this morning.  She denies any recent illness.  No headache.      History provided by:  Patient  Dizziness   Severity:  Moderate  Onset quality:  Sudden  Duration:  2 hours  Timing:  Constant  Progression:  Unchanged  Chronicity:  New  Context: standing up    Context: not with loss of consciousness    Relieved by:  Being still and lying down (Partial relief.)  Worsened by:  Movement  Associated symptoms: nausea (Intermittent.)    Associated symptoms: no chest pain, no headaches, no syncope, no tinnitus, no vision changes and no weakness        Review of Systems   Constitutional: Positive for fatigue.        Patient reports \"feeling off.\"   HENT: Negative for congestion and tinnitus.    Respiratory: Negative for cough.    Cardiovascular: Negative for chest pain and syncope.   Gastrointestinal: Positive for nausea (Intermittent.). Negative for abdominal pain.   Genitourinary: Negative for flank pain and pelvic pain.   Musculoskeletal: Negative for arthralgias, back pain, myalgias and neck pain.   Skin: Negative for rash.   Neurological: Positive for dizziness. Negative for syncope, weakness, numbness and headaches.        Tingling " present in bilateral hands which began yesterday.   All other systems reviewed and are negative.      Past Medical History:   Diagnosis Date   • Acid reflux    • Arthritis    • ASD (atrial septal defect)     Surgically repaired   • ASD (atrial septal defect)    • Gestational diabetes    • Migraines    • Psoriasis        Allergies   Allergen Reactions   • Bee Venom Swelling   • Imitrex [Sumatriptan] Shortness Of Breath       Past Surgical History:   Procedure Laterality Date   • CARDIAC SURGERY      ASD repair   •  SECTION     •  SECTION WITH TUBAL Bilateral 2017    Procedure:  SECTION REPEAT WITH TUBAL;  Surgeon: Kaela Blue MD;  Location: [facility] LABOR DELIVERY;  Service:    • KNEE ARTHROSCOPY Right     , ,    • LAPAROSCOPIC CHOLECYSTECTOMY     • ORIF ELBOW FRACTURE Left    • ORIF FOOT FRACTURE Left        Family History   Problem Relation Age of Onset   • Stroke Mother    • Thyroid disease Mother    • Arthritis Father    • Heart attack Father    • Hyperlipidemia Father    • Breast cancer Maternal Grandmother        Social History     Socioeconomic History   • Marital status:      Spouse name: Not on file   • Number of children: Not on file   • Years of education: Not on file   • Highest education level: Not on file   Tobacco Use   • Smoking status: Never Smoker   • Smokeless tobacco: Never Used   Substance and Sexual Activity   • Alcohol use: No   • Drug use: No   • Sexual activity: Defer         Objective   Physical Exam   Constitutional: She is oriented to person, place, and time. She appears well-developed and well-nourished. No distress.   Nontoxic-appearing female   HENT:   Head: Normocephalic and atraumatic.   Mouth/Throat: Oropharynx is clear and moist.   Tympanic membranes unremarkable in appearance bilaterally, no mucous membrane lesions   Eyes: Pupils are equal, round, and reactive to light. EOM are normal.   Visual acuity at baseline  subjectively, fast beating left-sided horizontal nystagmus noted   Neck: Normal range of motion. Neck supple. No JVD present.   Cardiovascular: Normal rate, regular rhythm, normal heart sounds and intact distal pulses. Exam reveals no gallop and no friction rub.   No murmur heard.  Pulmonary/Chest: Effort normal and breath sounds normal. No respiratory distress. She has no wheezes. She has no rales.   Abdominal: Soft. Bowel sounds are normal. She exhibits no distension and no mass. There is no tenderness. There is no rebound and no guarding.   Musculoskeletal: Normal range of motion. She exhibits edema (symmetrical, 1+ LE edema bilaterally).   Neurological: She is alert and oriented to person, place, and time. No cranial nerve deficit or sensory deficit. Coordination normal.   Clear and fluent speech, no ataxia, no dysmetria, 5 out of 5 strength in all fours, neurovascularly intact distally in all fours of bounding distal pulses normal sensation, no droop, no drift   Skin: Skin is warm and dry. No rash noted. She is not diaphoretic. No erythema.   Psychiatric: She has a normal mood and affect. Judgment and thought content normal.   Nursing note and vitals reviewed.      Procedures         ED Course  ED Course as of Jan 07 1620   Tue Jan 07, 2020   0821 44-year-old female presents for evaluation of dizziness.  On arrival to the ED, patient well-appearing.  Exam remarkable only for fast beating left-sided horizontal nystagmus with extraocular movements.  No other focal neurological deficits appreciated.  NIH stroke scale of 0.  EKG revealed normal sinus rhythm with a heart rate of 73, no ST segments suggestive of or concerning for ischemia, normal GA and QT intervals, no EKG evidence of Brugada syndrome, and no EKG evidence of hypertrophic cardiomyopathy.  No family history of sudden cardiac death.  We will obtain labs and imaging and will reassess following initial interventions.    [DD]   5897 MRI brain negative.   Chest x-ray negative.    [DD]   0941 At bedside reevaluating the patient and updating on lab and imaging results.--DD    [RF]   1114 Upon reevaluation following IV fluids and medications, the patient felt markedly improved.  Reassured and counseled regarding symptomatic management.  Doubt emergent process at this time.  The patient will follow up with her primary care physician within the next week.  Agreeable with plan and given appropriate strict return precautions.  Prescription for meclizine as needed.    [DD]   1149 Repeat troponin/EKG negative/unchanged.  Doubt ACS, PE, dissection, or emergent cardiothoracic process at this time based on exam, history, clinical presentation, gestalt, objective findings in the ED, and risk stratification.      [DD]      ED Course User Index  [DD] Marcellus Melvin MD  [RF] Shantelle Mars     Recent Results (from the past 24 hour(s))   CBC Auto Differential    Collection Time: 01/07/20  8:05 AM   Result Value Ref Range    WBC 7.93 3.40 - 10.80 10*3/mm3    RBC 5.15 3.77 - 5.28 10*6/mm3    Hemoglobin 15.2 12.0 - 15.9 g/dL    Hematocrit 46.8 (H) 34.0 - 46.6 %    MCV 90.9 79.0 - 97.0 fL    MCH 29.5 26.6 - 33.0 pg    MCHC 32.5 31.5 - 35.7 g/dL    RDW 12.4 12.3 - 15.4 %    RDW-SD 41.4 37.0 - 54.0 fl    MPV 8.9 6.0 - 12.0 fL    Platelets 279 140 - 450 10*3/mm3    Neutrophil % 54.1 42.7 - 76.0 %    Lymphocyte % 35.6 19.6 - 45.3 %    Monocyte % 6.3 5.0 - 12.0 %    Eosinophil % 3.2 0.3 - 6.2 %    Basophil % 0.5 0.0 - 1.5 %    Immature Grans % 0.3 0.0 - 0.5 %    Neutrophils, Absolute 4.30 1.70 - 7.00 10*3/mm3    Lymphocytes, Absolute 2.82 0.70 - 3.10 10*3/mm3    Monocytes, Absolute 0.50 0.10 - 0.90 10*3/mm3    Eosinophils, Absolute 0.25 0.00 - 0.40 10*3/mm3    Basophils, Absolute 0.04 0.00 - 0.20 10*3/mm3    Immature Grans, Absolute 0.02 0.00 - 0.05 10*3/mm3    nRBC 0.0 0.0 - 0.2 /100 WBC     Note: In addition to lab results from this visit, the labs listed above may include labs  "taken at another facility or during a different encounter within the last 24 hours. Please correlate lab times with ED admission and discharge times for further clarification of the services performed during this visit.    XR Chest 1 View    (Results Pending)   MRI Brain Without Contrast    (Results Pending)     Vitals:    01/07/20 0749 01/07/20 0757 01/07/20 0759   BP: 133/82  113/94   Pulse: 99  85   Resp: 20  20   Temp:  97.5 °F (36.4 °C)    TempSrc:  Oral    SpO2: 98%  100%   Weight: 118 kg (260 lb)     Height: 177.8 cm (70\")       Medications   sodium chloride 0.9 % flush 10 mL (has no administration in time range)   sodium chloride 0.9 % bolus 500 mL (has no administration in time range)   meclizine (ANTIVERT) tablet 25 mg (has no administration in time range)     ECG/EMG Results (last 24 hours)     ** No results found for the last 24 hours. **        ECG 12 Lead                     Recent Results (from the past 24 hour(s))   Light Blue Top    Collection Time: 01/07/20  8:05 AM   Result Value Ref Range    Extra Tube hold for add-on    Lavender Top    Collection Time: 01/07/20  8:05 AM   Result Value Ref Range    Extra Tube hold for add-on    CBC Auto Differential    Collection Time: 01/07/20  8:05 AM   Result Value Ref Range    WBC 7.93 3.40 - 10.80 10*3/mm3    RBC 5.15 3.77 - 5.28 10*6/mm3    Hemoglobin 15.2 12.0 - 15.9 g/dL    Hematocrit 46.8 (H) 34.0 - 46.6 %    MCV 90.9 79.0 - 97.0 fL    MCH 29.5 26.6 - 33.0 pg    MCHC 32.5 31.5 - 35.7 g/dL    RDW 12.4 12.3 - 15.4 %    RDW-SD 41.4 37.0 - 54.0 fl    MPV 8.9 6.0 - 12.0 fL    Platelets 279 140 - 450 10*3/mm3    Neutrophil % 54.1 42.7 - 76.0 %    Lymphocyte % 35.6 19.6 - 45.3 %    Monocyte % 6.3 5.0 - 12.0 %    Eosinophil % 3.2 0.3 - 6.2 %    Basophil % 0.5 0.0 - 1.5 %    Immature Grans % 0.3 0.0 - 0.5 %    Neutrophils, Absolute 4.30 1.70 - 7.00 10*3/mm3    Lymphocytes, Absolute 2.82 0.70 - 3.10 10*3/mm3    Monocytes, Absolute 0.50 0.10 - 0.90 10*3/mm3    " Eosinophils, Absolute 0.25 0.00 - 0.40 10*3/mm3    Basophils, Absolute 0.04 0.00 - 0.20 10*3/mm3    Immature Grans, Absolute 0.02 0.00 - 0.05 10*3/mm3    nRBC 0.0 0.0 - 0.2 /100 WBC   Urinalysis With Microscopic If Indicated (No Culture) - Urine, Clean Catch    Collection Time: 01/07/20  8:15 AM   Result Value Ref Range    Color, UA Yellow Yellow, Straw    Appearance, UA Clear Clear    pH, UA 6.0 5.0 - 8.0    Specific Gravity, UA 1.014 1.001 - 1.030    Glucose, UA Negative Negative    Ketones, UA Negative Negative    Bilirubin, UA Negative Negative    Blood, UA Negative Negative    Protein, UA Negative Negative    Leuk Esterase, UA Negative Negative    Nitrite, UA Negative Negative    Urobilinogen, UA 0.2 E.U./dL 0.2 - 1.0 E.U./dL   Comprehensive Metabolic Panel    Collection Time: 01/07/20  8:43 AM   Result Value Ref Range    Glucose 97 65 - 99 mg/dL    BUN 10 6 - 20 mg/dL    Creatinine 0.60 0.57 - 1.00 mg/dL    Sodium 139 136 - 145 mmol/L    Potassium 4.6 3.5 - 5.2 mmol/L    Chloride 104 98 - 107 mmol/L    CO2 25.0 22.0 - 29.0 mmol/L    Calcium 8.6 8.6 - 10.5 mg/dL    Total Protein 6.5 6.0 - 8.5 g/dL    Albumin 3.70 3.50 - 5.20 g/dL    ALT (SGPT) 18 1 - 33 U/L    AST (SGOT) 18 1 - 32 U/L    Alkaline Phosphatase 78 39 - 117 U/L    Total Bilirubin 0.4 0.2 - 1.2 mg/dL    eGFR Non African Amer 110 >60 mL/min/1.73    Globulin 2.8 gm/dL    A/G Ratio 1.3 g/dL    BUN/Creatinine Ratio 16.7 7.0 - 25.0    Anion Gap 10.0 5.0 - 15.0 mmol/L   Troponin    Collection Time: 01/07/20  8:43 AM   Result Value Ref Range    Troponin T <0.010 0.000 - 0.030 ng/mL   Magnesium    Collection Time: 01/07/20  8:43 AM   Result Value Ref Range    Magnesium 1.9 1.6 - 2.6 mg/dL   POCT pregnancy, urine    Collection Time: 01/07/20  8:43 AM   Result Value Ref Range    HCG, Urine, QL Negative Negative    Lot Number [id number]     Internal Positive Control Positive     Internal Negative Control Negative    Green Top (Gel)    Collection Time: 01/07/20   8:43 AM   Result Value Ref Range    Extra Tube Hold for add-ons.    Gold Top - SST    Collection Time: 01/07/20  8:43 AM   Result Value Ref Range    Extra Tube Hold for add-ons.    Troponin    Collection Time: 01/07/20 11:00 AM   Result Value Ref Range    Troponin T <0.010 0.000 - 0.030 ng/mL     Note: In addition to lab results from this visit, the labs listed above may include labs taken at another facility or during a different encounter within the last 24 hours. Please correlate lab times with ED admission and discharge times for further clarification of the services performed during this visit.    MRI Brain Without Contrast   Preliminary Result   No acute intracranial process is appreciated. Specifically,   no evidence for acute infarct. If symptoms or clinical concern persists   consider followup MRI imaging with IV contrast would provide a more   sensitive evaluation if clinically appropriate.        D:  01/07/2020   E:  01/07/2020          XR Chest 1 View   Preliminary Result   Mild cardiomegaly with postsurgical change of the chest   identified. No convincing evidence of active airspace disease   appreciated.       D:  01/07/2020   E:  01/07/2020                    Vitals:    01/07/20 1000 01/07/20 1130 01/07/20 1200 01/07/20 1209   BP: 106/70 100/65  100/51   Pulse: 61 62 63 69   Resp: 18 18  18   Temp:    98.3 °F (36.8 °C)   TempSrc:       SpO2: 100% 99% 100% 98%   Weight:       Height:         Medications   sodium chloride 0.9 % bolus 500 mL (0 mL Intravenous Stopped 1/7/20 0843)   meclizine (ANTIVERT) tablet 25 mg (25 mg Oral Given 1/7/20 0827)     ECG/EMG Results (last 24 hours)     Procedure Component Value Units Date/Time    ECG 12 Lead [[id number]] Collected:  01/07/20 0801     Updated:  01/07/20 1147    ECG 12 Lead [[id number]] Collected:  01/07/20 1105     Updated:  01/07/20 1154        ECG 12 Lead         ECG 12 Lead                   MDM    Final diagnoses:   Dizziness       Documentation  assistance provided by edwin Mars.  Information recorded by the edwin was done at my direction and has been verified and validated by me.     Shantelle Mars  01/07/20 0805       Shantelle Mars  01/07/20 0823       Marcellus Melvin MD  01/07/20 7244

## 2020-01-23 ENCOUNTER — TRANSCRIBE ORDERS (OUTPATIENT)
Dept: LAB | Facility: HOSPITAL | Age: 45
End: 2020-01-23

## 2020-01-23 ENCOUNTER — LAB (OUTPATIENT)
Dept: LAB | Facility: HOSPITAL | Age: 45
End: 2020-01-23

## 2020-01-23 DIAGNOSIS — N39.3 FEMALE STRESS INCONTINENCE: ICD-10-CM

## 2020-01-23 DIAGNOSIS — N39.3 FEMALE STRESS INCONTINENCE: Primary | ICD-10-CM

## 2020-01-23 PROCEDURE — 87086 URINE CULTURE/COLONY COUNT: CPT

## 2020-01-24 LAB — BACTERIA SPEC AEROBE CULT: NO GROWTH

## 2021-04-29 ENCOUNTER — HOSPITAL ENCOUNTER (EMERGENCY)
Facility: HOSPITAL | Age: 46
Discharge: HOME | End: 2021-04-29
Attending: EMERGENCY MEDICINE

## 2021-04-29 ENCOUNTER — APPOINTMENT (OUTPATIENT)
Dept: GENERAL RADIOLOGY | Facility: HOSPITAL | Age: 46
End: 2021-04-29

## 2021-04-29 VITALS
BODY MASS INDEX: 37.22 KG/M2 | HEART RATE: 69 BPM | RESPIRATION RATE: 16 BRPM | HEIGHT: 70 IN | OXYGEN SATURATION: 99 % | TEMPERATURE: 98.3 F | SYSTOLIC BLOOD PRESSURE: 129 MMHG | WEIGHT: 260 LBS | DIASTOLIC BLOOD PRESSURE: 85 MMHG

## 2021-04-29 DIAGNOSIS — S93.401A SPRAIN OF RIGHT ANKLE, UNSPECIFIED LIGAMENT, INITIAL ENCOUNTER: ICD-10-CM

## 2021-04-29 DIAGNOSIS — Z98.890 HISTORY OF ARTHROSCOPY OF RIGHT KNEE: ICD-10-CM
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
## 2021-04-29 DIAGNOSIS — M25.561 ACUTE PAIN OF RIGHT KNEE: ICD-10-CM

## 2021-04-29 PROCEDURE — 73560 X-RAY EXAM OF KNEE 1 OR 2: CPT

## 2021-04-29 PROCEDURE — 73610 X-RAY EXAM OF ANKLE: CPT

## 2021-04-29 PROCEDURE — 99283 EMERGENCY DEPT VISIT LOW MDM: CPT

## 2021-05-04 ENCOUNTER — OFFICE VISIT (OUTPATIENT)
Dept: ORTHOPEDIC SURGERY | Facility: CLINIC | Age: 46
End: 2021-05-04

## 2021-05-04 VITALS
HEART RATE: 75 BPM | DIASTOLIC BLOOD PRESSURE: 92 MMHG | SYSTOLIC BLOOD PRESSURE: 160 MMHG | HEIGHT: 70 IN | WEIGHT: 260.14 LBS | BODY MASS INDEX: 37.24 KG/M2

## 2021-05-04 DIAGNOSIS — M17.31 POST-TRAUMATIC OSTEOARTHRITIS OF RIGHT KNEE: Primary | ICD-10-CM

## 2021-05-04 PROCEDURE — 20610 DRAIN/INJ JOINT/BURSA W/O US: CPT | Performed by: ORTHOPAEDIC SURGERY

## 2021-05-04 PROCEDURE — 99204 OFFICE O/P NEW MOD 45 MIN: CPT | Performed by: ORTHOPAEDIC SURGERY

## 2021-05-04 RX ORDER — TRIAMCINOLONE ACETONIDE 40 MG/ML
80 INJECTION, SUSPENSION INTRA-ARTICULAR; INTRAMUSCULAR
Status: COMPLETED | OUTPATIENT
Start: 2021-05-04 | End: 2021-05-04

## 2021-05-04 RX ORDER — BUPIVACAINE HYDROCHLORIDE 2.5 MG/ML
3 INJECTION, SOLUTION EPIDURAL; INFILTRATION; INTRACAUDAL
Status: COMPLETED | OUTPATIENT
Start: 2021-05-04 | End: 2021-05-04

## 2021-05-04 RX ORDER — LIDOCAINE HYDROCHLORIDE 10 MG/ML
3 INJECTION, SOLUTION EPIDURAL; INFILTRATION; INTRACAUDAL; PERINEURAL
Status: COMPLETED | OUTPATIENT
Start: 2021-05-04 | End: 2021-05-04

## 2021-05-04 RX ADMIN — TRIAMCINOLONE ACETONIDE 80 MG: 40 INJECTION, SUSPENSION INTRA-ARTICULAR; INTRAMUSCULAR at 08:24

## 2021-05-04 RX ADMIN — LIDOCAINE HYDROCHLORIDE 3 ML: 10 INJECTION, SOLUTION EPIDURAL; INFILTRATION; INTRACAUDAL; PERINEURAL at 08:24

## 2021-05-04 RX ADMIN — BUPIVACAINE HYDROCHLORIDE 3 ML: 2.5 INJECTION, SOLUTION EPIDURAL; INFILTRATION; INTRACAUDAL at 08:24

## 2021-05-04 NOTE — PROGRESS NOTES
Orthopaedic Clinic Note: Knee New Patient    Chief Complaint   Patient presents with   • Right Knee - Pain     DOI: 21    •              HPI    Larissa Jones is a 45 y.o. female who presents with right knee pain for 5 day(s). Onset mechanical fall on 2021.  Patient has a remote history of ACL and PCL injury as well as multiple distal surgeries.  She has known posttraumatic arthritis of the knee.  Since the fall which resulted in a hyperflexion moment in the knee, she has had pain is localized to the entire knee (globally) and is a 4/10 on the pain scale. Pain is described as aching and stabbing. Associated symptoms include pain, swelling, popping, grinding, stiffness and giving way/buckling. The pain is worse with walking, standing, climbing stairs, working and leisure; resting and ice make it better. Previous treatments have included: bracing, cane/walker, NSAIDS, physical therapy and weight loss since symptom onset. Although some transient relief was reported with these interventions, these conservative measures have failed and symptoms have persisted. The patient is limited in daily activities and has had a significant decrease in quality of life as a result. She denies fevers, chills, or constitutional symptoms.    I have reviewed the following portions of the patient's history:History of Present Illness    Past Medical History:   Diagnosis Date   • Acid reflux    • Arthritis    • ASD (atrial septal defect)     Surgically repaired   • ASD (atrial septal defect)    • Gestational diabetes    • Migraines    • Psoriasis       Past Surgical History:   Procedure Laterality Date   • CARDIAC SURGERY      ASD repair   •  SECTION     •  SECTION WITH TUBAL Bilateral 2017    Procedure:  SECTION REPEAT WITH TUBAL;  Surgeon: Kaela Blue MD;  Location: [facility] LABOR DELIVERY;  Service:    • KNEE ARTHROSCOPY Right     , ,    • LAPAROSCOPIC CHOLECYSTECTOMY     •  ORIF ELBOW FRACTURE Left 1980   • ORIF FOOT FRACTURE Left 2007      Family History   Problem Relation Age of Onset   • Stroke Mother    • Thyroid disease Mother    • Arthritis Father    • Heart attack Father    • Hyperlipidemia Father    • Breast cancer Maternal Grandmother      Social History     Socioeconomic History   • Marital status:      Spouse name: Not on file   • Number of children: Not on file   • Years of education: Not on file   • Highest education level: Not on file   Tobacco Use   • Smoking status: Never Smoker   • Smokeless tobacco: Never Used   Substance and Sexual Activity   • Alcohol use: No   • Drug use: No   • Sexual activity: Defer      Current Outpatient Medications on File Prior to Visit   Medication Sig Dispense Refill   • Acetone, Urine, Test (KETONE TEST) strip 1 strip by In Vitro route Daily. Test urine ketones daily in am 50 strip 2   • clobetasol (TEMOVATE) 0.05 % cream      • diclofenac (VOLTAREN) 50 MG EC tablet Take 1 tablet by mouth 3 (Three) Times a Day. 21 tablet 0   • ferrous sulfate 325 (65 FE) MG tablet Take 1 tablet by mouth Daily With Breakfast. 30 tablet 1   • glucose blood (ONETOUCH VERIO) test strip Test blood sugars  each 5   • meclizine (ANTIVERT) 25 MG tablet Take 1 tablet by mouth 3 (Three) Times a Day As Needed for Dizziness. 20 tablet 0   • ONETOUCH DELICA LANCETS 33G misc Test blood sugars  each 5   • Prenatal Vit-Fe Fumarate-FA (PRENATAL 27-1) 27-1 MG tablet tablet Take 1 tablet by mouth Daily.     • Urine Glucose-Ketones Test (KETO-DIASTIX) strip   0     No current facility-administered medications on file prior to visit.      Allergies   Allergen Reactions   • Bee Venom Swelling   • Imitrex [Sumatriptan] Shortness Of Breath        Review of Systems   Constitutional: Negative.    HENT: Negative.    Eyes: Negative.    Respiratory: Negative.    Cardiovascular: Negative.    Gastrointestinal: Negative.    Endocrine: Negative.    Genitourinary:  "Negative.    Musculoskeletal: Positive for arthralgias.   Skin: Negative.    Allergic/Immunologic: Negative.    Neurological: Negative.    Hematological: Negative.    Psychiatric/Behavioral: Negative.         The patient's Review of Systems was personally reviewed and confirmed as accurate.    The following portions of the patient's history were reviewed and updated as appropriate: allergies, current medications, past family history, past medical history, past social history, past surgical history and problem list.    Physical Exam  Blood pressure 160/92, pulse 75, height 177.8 cm (70\"), weight 118 kg (260 lb 2.3 oz), unknown if currently breastfeeding.    Body mass index is 37.33 kg/m².    GENERAL APPEARANCE: awake, alert & oriented x 3, in no acute distress and well developed, well nourished  PSYCH: normal affect  LUNGS:  breathing nonlabored  EYES: sclera anicteric  CARDIOVASCULAR: palpable dorsalis pedis, palpable posterior tibial bilaterally. Capillary refill less than 2 seconds  EXTREMITIES: no clubbing, cyanosis  GAIT:  Antalgic            Right Lower Extremity Exam:   ----------  Hip Exam  ----------  FLEXION CONTRACTURE: None  FLEXION: 110 degrees  INTERNAL ROTATION: 20 degrees at 90 degrees of flexion   EXTERNAL ROTATION: 40 degrees at 90 degrees of flexion    PAIN WITH HIP MOTION: no  ----------  Knee Exam  ----------  ALIGNMENT: severe valgus, correctable to neutral    RANGE OF MOTION:  Decreased (5 - 90 degrees) with no extensor lag  LIGAMENTOUS STABILITY:   stable to varus and valgus stress at terminal extension and 30 degrees; retensioning of the LCL is appreciated with varus stress at 30 degrees consistent with lateral compartment degeneration     STRENGTH:  4/5 knee flexion, extension. 5/5 ankle dorsiflexion and plantarflexion.     PAIN WITH PALPATION: global  KNEE EFFUSION: yes, moderate effusion  PAIN WITH KNEE ROM: yes, global  PATELLAR CREPITUS: yes, painful and symptomatic  SPECIAL EXAM " FINDINGS:  none    REFLEXES:  PATELLAR 2+/4  ACHILLES 2+/4    CLONUS: no  STRAIGHT LEG TEST:   negative    SENSATION TO LIGHT TOUCH:  DEEP PERONEAL/SUPERFICIAL PERONEAL/SURAL/SAPHENOUS/TIBIAL:   intact    EDEMA:  no  ERYTHEMA:  no  WOUNDS/INCISIONS: Yes, well-healed arthroscopic portals        Left Lower Extremity Exam:   ----------  Hip Exam  ----------  FLEXION CONTRACTURE: None  FLEXION: 110 degrees  INTERNAL ROTATION: 20 degrees at 90 degrees of flexion   EXTERNAL ROTATION: 40 degrees at 90 degrees of flexion    PAIN WITH HIP MOTION: no  ----------  Knee Exam  ----------  ALIGNMENT: neutral, no varus or valgus deformity     RANGE OF MOTION:  Normal (0-120 degrees) with no extensor lag or flexion contracture  LIGAMENTOUS STABILITY:   stable to varus and valgus stress at terminal extension and 30 degrees without any evidence of laxity     STRENGTH:  5/5 knee flexion, extension. 5/5 ankle dorsiflexion and plantarflexion.     PAIN WITH PALPATION: denies tenderness to palpation about the knee, denies medial or lateral joint line pain  KNEE EFFUSION: no  PAIN WITH KNEE ROM: no  PATELLAR CREPITUS: yes, asymptomatic  SPECIAL EXAM FINDINGS:  Negative patellar compression    REFLEXES:  PATELLAR 2+/4  ACHILLES 2+/4    CLONUS: negative  STRAIGHT LEG TEST:   negative    SENSATION TO LIGHT TOUCH:  DEEP PERONEAL/SUPERFICIAL PERONEAL/SURAL/SAPHENOUS/TIBIAL:   intact    EDEMA:  no  ERYTHEMA:  no  WOUNDS/INCISIONS: no overlying skin problems.    ______________________________________________________________________  ______________________________________________________________________    RADIOGRAPHIC FINDINGS:   Radiographs from 4/29/2021 are personally reviewed.  Radiographs demonstrate moderate to severe tricompartmental osteoarthritis with genu valgum alignment and bone-on-bone articulation lateral compartment.  No acute bony injury or fracture identified.    Assessment/Plan:   Diagnosis Plan   1. Post-traumatic  osteoarthritis of right knee  Large Joint Arthrocentesis: R knee     Patient is experiencing an arthritic exacerbation of the right knee.  I discussed treatment options with her.  She is already taking prescription anti-inflammatory with minimal improvement.  I recommend a cortisone injection in the right knee today.  She is agreeable to this plan.  She will follow-up as needed.    Procedure Note:  I discussed with the patient the potential benefits of performing a therapeutic injection of the right knee as well as potential risks including but not limited to infection, swelling, pain, bleeding, bruising, nerve/vessel damage, skin color changes, transient elevation in blood glucose levels, and fat atrophy. After informed consent and verifying correct patient, procedure site, and type of procedure, the area was prepped with alcohol, ethyl chloride was used to numb the skin. Via the superior lateral approach, 3cc of 1% lidocaine, 3cc of 0.25% bupivicaine and 2 cc of 40mg/ml of Kenalog were injected into the right knee. The patient tolerated the procedure well. There were no complications. A sterile dressing was placed over the injection site.      Florencio Estrada MD  05/04/21  08:31 EDT

## 2021-05-04 NOTE — PROGRESS NOTES
Procedure   Large Joint Arthrocentesis: R knee  Date/Time: 5/4/2021 8:24 AM  Consent given by: patient  Site marked: site marked  Timeout: Immediately prior to procedure a time out was called to verify the correct patient, procedure, equipment, support staff and site/side marked as required   Supporting Documentation  Indications: pain   Procedure Details  Location: knee - R knee  Preparation: Patient was prepped and draped in the usual sterile fashion  Needle size: 23 G  Approach: anterolateral  Medications administered: 3 mL bupivacaine (PF) 0.25 %; 3 mL lidocaine PF 1% 1 %; 80 mg triamcinolone acetonide 40 MG/ML  Patient tolerance: patient tolerated the procedure well with no immediate complications

## 2021-05-19 ENCOUNTER — OFFICE VISIT (OUTPATIENT)
Dept: ORTHOPEDIC SURGERY | Facility: CLINIC | Age: 46
End: 2021-05-19

## 2021-05-19 VITALS
BODY MASS INDEX: 37.24 KG/M2 | HEIGHT: 70 IN | HEART RATE: 81 BPM | DIASTOLIC BLOOD PRESSURE: 85 MMHG | WEIGHT: 260.14 LBS | SYSTOLIC BLOOD PRESSURE: 153 MMHG

## 2021-05-19 DIAGNOSIS — M17.11 PRIMARY OSTEOARTHRITIS OF RIGHT KNEE: Primary | ICD-10-CM

## 2021-05-19 PROCEDURE — 99214 OFFICE O/P EST MOD 30 MIN: CPT | Performed by: ORTHOPAEDIC SURGERY

## 2021-05-19 NOTE — PROGRESS NOTES
[facility] [place] [facility] Note    Subjective     Chief Complaint   Patient presents with   • Right Knee - Pain     Dr. Estrada injected knee 2021        HPI    Larissa Jones is a 45 y.o. female who presents with new problem of right knee pain. She has a known history of arthritis. Her right knee pain began around 20 years ago. The pain is associated with swelling, popping, grinding, stiffness, giving way and worsens with walking, standing, sitting, and climbing stairs. She has tried bracing, anti-inflammatories, physical therapy, and weight loss. We previously did viscosupplementation injections with relief.     She states her right knee was doing well for the past few years, but then she fell 3 weeks ago when taking the trash. During the fall, she states she bent her knee more than she had for the past 10 years and pulled her quadricep. Afterwards, she could not bear weight on her knee and bruised her ankle. It has improved since then and she has been taking anti-inflammatories with relief. She denies any numbness or tingling in her foot.     I have reviewed the following portions of the patient's history and agree with: History of Present Illness and Review of Systems    Patient Active Problem List   Diagnosis   • History of  delivery, antepartum   • Family history of congenital heart defect   • Diet controlled gestational diabetes mellitus (GDM) in third trimester   • Status post repeat low transverse  section     Past Medical History:   Diagnosis Date   • Acid reflux    • Arthritis    • ASD (atrial septal defect)     Surgically repaired   • ASD (atrial septal defect)    • Gestational diabetes    • Migraines    • Psoriasis       Past Surgical History:   Procedure Laterality Date   • CARDIAC SURGERY      ASD repair   •  SECTION     •  SECTION WITH TUBAL Bilateral 2017    Procedure:  SECTION REPEAT WITH TUBAL;  Surgeon: Kaela Blue MD;   Location: [facility] LABOR DELIVERY;  Service:    • KNEE ARTHROSCOPY Right     1994, 2003, 2005   • LAPAROSCOPIC CHOLECYSTECTOMY  2002   • ORIF ELBOW FRACTURE Left 1980   • ORIF FOOT FRACTURE Left 2007      Family History   Problem Relation Age of Onset   • Stroke Mother    • Thyroid disease Mother    • Arthritis Father    • Heart attack Father    • Hyperlipidemia Father    • Breast cancer Maternal Grandmother      Social History     Socioeconomic History   • Marital status:      Spouse name: Not on file   • Number of children: Not on file   • Years of education: Not on file   • Highest education level: Not on file   Tobacco Use   • Smoking status: Never Smoker   • Smokeless tobacco: Never Used   Substance and Sexual Activity   • Alcohol use: No   • Drug use: No   • Sexual activity: Defer      Current Outpatient Medications on File Prior to Visit   Medication Sig Dispense Refill   • Acetone, Urine, Test (KETONE TEST) strip 1 strip by In Vitro route Daily. Test urine ketones daily in am 50 strip 2   • clobetasol (TEMOVATE) 0.05 % cream      • diclofenac (VOLTAREN) 50 MG EC tablet Take 1 tablet by mouth 3 (Three) Times a Day. 21 tablet 0   • ferrous sulfate 325 (65 FE) MG tablet Take 1 tablet by mouth Daily With Breakfast. 30 tablet 1   • glucose blood (ONETOUCH VERIO) test strip Test blood sugars  each 5   • meclizine (ANTIVERT) 25 MG tablet Take 1 tablet by mouth 3 (Three) Times a Day As Needed for Dizziness. 20 tablet 0   • ONETOUCH DELICA LANCETS 33G misc Test blood sugars  each 5   • Prenatal Vit-Fe Fumarate-FA (PRENATAL 27-1) 27-1 MG tablet tablet Take 1 tablet by mouth Daily.     • Urine Glucose-Ketones Test (KETO-DIASTIX) strip   0     No current facility-administered medications on file prior to visit.      Allergies   Allergen Reactions   • Bee Venom Swelling   • Imitrex [Sumatriptan] Shortness Of Breath        Review of Systems   Constitutional: Negative for activity change, appetite  "change, chills, diaphoresis, fatigue, fever and unexpected weight change.   HENT: Negative for congestion, dental problem, drooling, ear discharge, ear pain, facial swelling, hearing loss, mouth sores, nosebleeds, postnasal drip, rhinorrhea, sinus pressure, sneezing, sore throat, tinnitus, trouble swallowing and voice change.    Eyes: Negative for photophobia, pain, discharge, redness, itching and visual disturbance.   Respiratory: Negative for apnea, cough, choking, chest tightness, shortness of breath, wheezing and stridor.    Cardiovascular: Negative for chest pain, palpitations and leg swelling.   Gastrointestinal: Negative for abdominal distention, abdominal pain, anal bleeding, blood in stool, constipation, diarrhea, nausea, rectal pain and vomiting.   Endocrine: Negative for cold intolerance, heat intolerance, polydipsia, polyphagia and polyuria.   Genitourinary: Negative for decreased urine volume, difficulty urinating, dysuria, enuresis, flank pain, frequency, genital sores, hematuria and urgency.   Musculoskeletal: Positive for arthralgias. Negative for back pain, gait problem, joint swelling, myalgias, neck pain and neck stiffness.   Skin: Negative for color change, pallor, rash and wound.   Allergic/Immunologic: Negative for environmental allergies, food allergies and immunocompromised state.   Neurological: Negative for dizziness, tremors, seizures, syncope, facial asymmetry, speech difficulty, weakness, light-headedness, numbness and headaches.   Hematological: Negative for adenopathy. Does not bruise/bleed easily.   Psychiatric/Behavioral: Negative for agitation, behavioral problems, confusion, decreased concentration, dysphoric mood, hallucinations, self-injury, sleep disturbance and suicidal ideas. The patient is not nervous/anxious and is not hyperactive.         Objective      Physical Exam  /85   Pulse 81   Ht 177.8 cm (70\")   Wt 118 kg (260 lb 2.3 oz)   BMI 37.33 kg/m²     Body mass " index is 37.33 kg/m².    General:   Mental Status:  Alert   Appearance: Cooperative, in no acute distress   Build and Nutrition: Overweight by BMI female   Orientation: Alert and oriented to person, place and time   Posture: Normal   Gait: Mildly antalgic on the right.     Integument       • Right knee: No skin lesions, rash, or ecchymosis.    Lower Extremities  • Right Knee:        • Tenderness: Some medial and lateral joint line tenderness       • Swelling: None        • Effusion: 1+       • Crepitus: None       • Atrophy: None       • Range of motion:             • Extension: 5°             • Flexion: 85°        • Instability: Mild valgus laxity with a firm endpoint. Positive anterior drawer.       • Deformities: Valgus alignment       • Sensation is intact distally.    Imaging/Studies  XR Knee 1 or 2 View Right (04/29/2021 21:05)    I did review her radiographs from 4/29/2021 and agree with findings. She does have valgus alignment, bone-on-bone contact in the lateral compartment and tricompartmental osteophytes.    Assessment and Plan     Diagnoses and all orders for this visit:    1. Primary osteoarthritis of right knee (Primary)  -     Ambulatory Referral to Physical Therapy Evaluate and treat, Ortho        1. Primary osteoarthritis of right knee        We discussed her right knee x-rays which demonstrated arthritis with bone-on-bone contact. As we have previously discussed, she will likely require a right total knee arthroplasty. She is not interested in proceeding with surgical intervention at this time as she is worried about foot drop and would like to wait until her son is older. I assured her that she has a low chance of developing foot drop and that even if she did develop it, it would likely be transient and resolve within 1 to 2 months. She would like to do physical therapy and I placed this referral for her today.    Return in about 6 months (around 11/19/2021).      Scribed for Daren Giordano MD  by Yarely Mcfarlane.  05/19/21   16:56 EDT    I have personally performed the services described in this document as scribed by the above individual, and it is both accurate and complete.  Daren Giordano MD  5/20/2021  07:23 EDT

## 2021-06-22 ENCOUNTER — TELEPHONE (OUTPATIENT)
Dept: PHYSICAL THERAPY | Facility: CLINIC | Age: 46
End: 2021-06-22

## 2022-10-19 ENCOUNTER — OFFICE VISIT (OUTPATIENT)
Dept: ORTHOPEDIC SURGERY | Facility: CLINIC | Age: 47
End: 2022-10-19

## 2022-10-19 VITALS
DIASTOLIC BLOOD PRESSURE: 86 MMHG | HEIGHT: 70 IN | BODY MASS INDEX: 39.11 KG/M2 | WEIGHT: 273.2 LBS | SYSTOLIC BLOOD PRESSURE: 122 MMHG

## 2022-10-19 DIAGNOSIS — M17.11 PRIMARY OSTEOARTHRITIS OF RIGHT KNEE: Primary | ICD-10-CM

## 2022-10-19 PROCEDURE — 99214 OFFICE O/P EST MOD 30 MIN: CPT | Performed by: ORTHOPAEDIC SURGERY

## 2022-10-19 RX ORDER — PREGABALIN 150 MG/1
150 CAPSULE ORAL ONCE
Status: CANCELLED | OUTPATIENT
Start: 2022-10-19 | End: 2022-10-19

## 2022-10-19 RX ORDER — IBUPROFEN 600 MG/1
600 TABLET ORAL EVERY 6 HOURS PRN
COMMUNITY

## 2022-10-19 RX ORDER — ACETAMINOPHEN 325 MG/1
1000 TABLET ORAL ONCE
Status: CANCELLED | OUTPATIENT
Start: 2022-10-19 | End: 2022-10-19

## 2022-10-19 RX ORDER — MELOXICAM 7.5 MG/1
15 TABLET ORAL ONCE
Status: CANCELLED | OUTPATIENT
Start: 2022-10-19 | End: 2022-10-19

## 2022-10-19 NOTE — PROGRESS NOTES
[facility] [place] [facility] Note    Subjective     Chief Complaint   Patient presents with   • Right Knee - Follow-up, Pain     Primary osteoarthritis of right knee (cortisone injection 2021)        HPI    It has been 1.5  year(s) since Ms. Jones's last visit. She returns to clinic today for follow-up of right knee arthritis. The issue has been ongoing for 20 year(s). She rates her pain a 3/10 on the pain scale. Previous/current treatments: bracing, NSAIDS, physical therapy and weight loss. Current symptoms: same as prior visit. The pain is worse with walking, standing, climbing stairs and leisure; resting, sitting, ice, pain medication and/or NSAID and elevating the extremity improve the pain.  Overall she is worsening over time.  She has reached a point where she would like to proceed with knee replacement surgery.  She has exhausted conservative treatment.  No history of clots or clotting disorders.  No blood thinners.  No diabetes.  No heart disease other than on heart defect when she was a child that was repaired.  Her mother will be able to help out postoperatively.    I have reviewed the following portions of the patient's history and agree with: History of Present Illness and Review of Systems    Patient Active Problem List   Diagnosis   • History of  delivery, antepartum   • Family history of congenital heart defect   • Diet controlled gestational diabetes mellitus (GDM) in third trimester   • Status post repeat low transverse  section   • Primary osteoarthritis of right knee     Past Medical History:   Diagnosis Date   • Acid reflux    • Arthritis    • ASD (atrial septal defect)     Surgically repaired   • ASD (atrial septal defect)    • Gestational diabetes    • Migraines    • Psoriasis       Past Surgical History:   Procedure Laterality Date   • CARDIAC SURGERY      ASD repair   •  SECTION     •  SECTION WITH TUBAL Bilateral 2017    Procedure:   SECTION REPEAT WITH TUBAL;  Surgeon: Kaela Blue MD;  [facility];  Service:    • KNEE ARTHROSCOPY Right     , ,    • LAPAROSCOPIC CHOLECYSTECTOMY     • ORIF ELBOW FRACTURE Left    • ORIF FOOT FRACTURE Left       Family History   Problem Relation Age of Onset   • Stroke Mother    • Thyroid disease Mother    • Arthritis Father    • Heart attack Father    • Hyperlipidemia Father    • Breast cancer Maternal Grandmother      Social History     Socioeconomic History   • Marital status:    Tobacco Use   • Smoking status: Never   • Smokeless tobacco: Never   Substance and Sexual Activity   • Alcohol use: No   • Drug use: No   • Sexual activity: Defer      Current Outpatient Medications on File Prior to Visit   Medication Sig Dispense Refill   • ibuprofen (ADVIL,MOTRIN) 600 MG tablet Take 1 tablet by mouth Every 6 (Six) Hours As Needed for Mild Pain.     • Pseudoeph-Doxylamine-DM-APAP (NYQUIL PO) Take  by mouth.     • [DISCONTINUED] Acetone, Urine, Test (KETONE TEST) strip 1 strip by In Vitro route Daily. Test urine ketones daily in am 50 strip 2   • [DISCONTINUED] clobetasol (TEMOVATE) 0.05 % cream      • [DISCONTINUED] diclofenac (VOLTAREN) 50 MG EC tablet Take 1 tablet by mouth 3 (Three) Times a Day. 21 tablet 0   • [DISCONTINUED] ferrous sulfate 325 (65 FE) MG tablet Take 1 tablet by mouth Daily With Breakfast. 30 tablet 1   • [DISCONTINUED] glucose blood (ONETOUCH VERIO) test strip Test blood sugars  each 5   • [DISCONTINUED] meclizine (ANTIVERT) 25 MG tablet Take 1 tablet by mouth 3 (Three) Times a Day As Needed for Dizziness. 20 tablet 0   • [DISCONTINUED] ONETOUCH DELICA LANCETS 33G misc Test blood sugars  each 5   • [DISCONTINUED] Prenatal Vit-Fe Fumarate-FA (PRENATAL 27-1) 27-1 MG tablet tablet Take 1 tablet by mouth Daily.     • [DISCONTINUED] Urine Glucose-Ketones Test (KETO-DIASTIX) strip   0     No current facility-administered  medications on file prior to visit.      Allergies   Allergen Reactions   • Bee Venom Swelling   • Imitrex [Sumatriptan] Shortness Of Breath        Review of Systems   Constitutional: Negative for activity change, appetite change, chills, diaphoresis, fatigue, fever and unexpected weight change.   HENT: Negative for congestion, dental problem, drooling, ear discharge, ear pain, facial swelling, hearing loss, mouth sores, nosebleeds, postnasal drip, rhinorrhea, sinus pressure, sneezing, sore throat, tinnitus, trouble swallowing and voice change.    Eyes: Negative for photophobia, pain, discharge, redness, itching and visual disturbance.   Respiratory: Negative for apnea, cough, choking, chest tightness, shortness of breath, wheezing and stridor.    Cardiovascular: Negative for chest pain, palpitations and leg swelling.   Gastrointestinal: Negative for abdominal distention, abdominal pain, anal bleeding, blood in stool, constipation, diarrhea, nausea, rectal pain and vomiting.   Endocrine: Negative for cold intolerance, heat intolerance, polydipsia, polyphagia and polyuria.   Genitourinary: Negative for decreased urine volume, difficulty urinating, dysuria, enuresis, flank pain, frequency, genital sores, hematuria and urgency.   Musculoskeletal: Positive for arthralgias, gait problem and joint swelling. Negative for back pain, myalgias, neck pain and neck stiffness.   Skin: Negative for color change, pallor, rash and wound.   Allergic/Immunologic: Negative for environmental allergies, food allergies and immunocompromised state.   Neurological: Negative for dizziness, tremors, seizures, syncope, facial asymmetry, speech difficulty, weakness, light-headedness, numbness and headaches.   Hematological: Negative for adenopathy. Does not bruise/bleed easily.   Psychiatric/Behavioral: Negative for agitation, behavioral problems, confusion, decreased concentration, dysphoric mood, hallucinations, self-injury, sleep  "disturbance and suicidal ideas. The patient is not nervous/anxious and is not hyperactive.         Objective      Physical Exam  /86   Ht 177.8 cm (70\")   Wt 124 kg (273 lb 3.2 oz)   BMI 39.20 kg/m²     Body mass index is 39.2 kg/m².    General:   Mental Status:  Alert   Appearance: Cooperative, in no acute distress   Build and Nutrition: Obese by BMI female   Orientation: Alert and oriented to person, place and time   Posture: Normal   Gait: Antalgic on the right, valgus    Integument:   Right knee: No skin lesions, no rash, no ecchymosis    Lower Extremities:   Right Knee:    Tenderness:  Medial and lateral joint line tenderness    Effusion:  1+    Swelling: None    Crepitus:  Positive    Range of motion:  Extension: 0°       Flexion: 115°  Instability:  No varus laxity, no valgus laxity, negative anterior drawer  Deformities:  Severe valgus      Imaging/Studies  Imaging Results (Last 24 Hours)     Procedure Component Value Units Date/Time    XR Knee 4+ View Right [[id number]] Resulted: 10/19/22 1347     Updated: 10/19/22 1349    Narrative:      Right Knee Radiographs  Indication: right knee pain  Views: Standing AP's and skiers of both knees, with lateral and sunrise   views of the right knee    Comparison: Nonweightbearing imaging from 4/29/2021    Findings:   Bone-on-bone contact lateral compartment, tricompartmental degeneration,   valgus alignment, no acute bony abnormalities.  No significant change   compared to previous imaging.            Assessment and Plan     Diagnoses and all orders for this visit:    1. Primary osteoarthritis of right knee (Primary)  -     XR Knee 4+ View Right  -     Case Request; Standing  -     Instructions on coughing, deep breathing, and incentive spirometry.; Future  -     CBC and Differential; Future  -     Basic metabolic panel; Future  -     Protime-INR; Future  -     APTT; Future  -     Hemoglobin A1c; Future  -     Sedimentation rate; Future  -     C-reactive " protein; Future  -     Tranexamic Acid 1,000 mg in sodium chloride 0.9 % 100 mL  -     Tranexamic Acid 1,000 mg in sodium chloride 0.9 % 100 mL  -     ceFAZolin (ANCEF) 2 g in sodium chloride 0.9 % 100 mL IVPB  -     acetaminophen (TYLENOL) tablet 975 mg  -     meloxicam (MOBIC) tablet 15 mg  -     pregabalin (LYRICA) capsule 150 mg  -     mupirocin (BACTROBAN) 2 % nasal ointment 1 application    Other orders  -     Outpatient In A Bed; Standing  -     Follow Anesthesia Guidelines / Standing Orders; Future  -     Obtain informed consent  -     Provide instructions to patient regarding NPO status  -     Chlorhexidine Skin Prep - Educate and Review With Patient; Future  -     Provide Patient With ERAS Hydration Instructions  -     Provide Patient With Enhanced Recovery Booklet(s) or Handout  -     Provide Instructions/Handout For Benzoyl Peroxide 5% Wash If Having Shoulder/Arm Surgery (If Prescribed)  -     Provide Instructions/Handout For Bactroban And Chlorhexidine Shower (If Prescribed)  -     Perform A Memory Screening On All Hip/Knee Replacement Patients >Or Equal To 65 Years Or Older  -     Complete A PROMIS And HOOS Or KOOS Survey If Having Hip Or Knee Replacement  -     Follow Anesthesia Guidelines / Standing Orders; Standing  -     Nerve Block; Standing  -     Verify NPO Status; Standing  -     Verify The Time Patient Completed ERAS Hydration Drink; Standing  -     SCD (sequential compression device)- to be placed on patient in Pre-op; Standing  -     POC Glucose Once; Standing  -     Clip operative site; Standing  -     Obtain informed consent (if not collected inpatient or PAT); Standing  -     Notify Physician - Standard; Standing  -     Provide Patient With Carbo Loading Instructions  -     Provide Patient With ERAS Booklet(s)/Handout  -     mupirocin (Bactroban Nasal) 2 % nasal ointment; into the nostril(s) as directed by provider 2 (Two) Times a Day.  Dispense: 22 g; Refill: 0  -     chlorhexidine  (HIBICLENS) 4 % external liquid; Apply  topically to the appropriate area as directed Daily. Shower with hibiclens solution as directed for 5 days prior to surgery  Dispense: 236 mL; Refill: 0        1. Primary osteoarthritis of right knee        Reviewed my findings with the patient.  She has reached a point where she would like to proceed with knee replacement surgery, and she has exhausted conservative treatment options.  Longstanding valgus deformity, and she understands ramifications of this regarding the potential of peroneal nerve palsy postoperatively, that is usually transient if it occurs, and rarely permanent.  Risks, benefits, and alternatives to surgery been discussed.  She would like to proceed, and is going to coordinate with her family on the best timing.  Please see my counseling note for details    Surgical Counseling     I have informed the patient of the diagnosis and the prognosis.  Exhaustive conservative treatment modalities have not resulted in long term pain relief.  The symptoms have progressed to the point of daily pain and inability to perform activities of daily living without significant pain. The patient has reached the point of desiring to proceed with total knee arthroplasty after discussing the risks, benefits and alternatives to the procedure.  The surgical procedure itself was discussed in detail. Risks of the procedure were discussed, which included but are not limited to, bleeding, infection, damage to blood vessels and nerves, incomplete pain relief, loosening of the prosthesis (early or late), deep infection (early or late), need for further surgery, loss of limb, deep venous thrombosis, pulmonary embolus, death, heart attack, stroke, kidney failure, liver failure, and anesthetic complications.  In addition, the potential for deep infection developing in the future was discussed, which could require further surgery.  The knee would have to be re-opened, debrided, and  potentially remove the prosthesis, which may or may not be replaced in the future.  Also, the possibility for loosening of the prosthesis has been mentioned.  If the prosthesis loosened, a revision arthroplasty could be performed, with results that are not as predictable compared to the original procedure.  The typical rehabilitative course has also been discussed, and full recovery may take up to a year to see the maximum benefit.  The importance of patient cooperation in the rehabilitative efforts has also been discussed.  No guarantees were given.  The patient understands the potential risks versus the benefits and desires to proceed with total knee arthroplasty at a mutually convenient time.    Return for surgery.      Daren Giordano MD  10/19/22  14:12 EDT

## 2023-03-21 ENCOUNTER — TELEPHONE (OUTPATIENT)
Dept: ORTHOPEDIC SURGERY | Facility: CLINIC | Age: 48
End: 2023-03-21

## 2023-03-21 NOTE — TELEPHONE ENCOUNTER
Caller: LAVINIA   Relationship to Patient: SELF  Phone Number: [phone]   Reason for Call: PATIENT STATES SHE WAS MAILED INFORMATION ABOUT HER SX AND SHE LOST IT

## 2023-04-12 ENCOUNTER — PRE-ADMISSION TESTING (OUTPATIENT)
Dept: PREADMISSION TESTING | Facility: HOSPITAL | Age: 48
End: 2023-04-12
Payer: COMMERCIAL

## 2023-04-12 VITALS — HEIGHT: 70 IN | WEIGHT: 281.31 LBS | BODY MASS INDEX: 40.27 KG/M2

## 2023-04-12 DIAGNOSIS — M17.11 PRIMARY OSTEOARTHRITIS OF RIGHT KNEE: ICD-10-CM

## 2023-04-12 LAB
ANION GAP SERPL CALCULATED.3IONS-SCNC: 8 MMOL/L (ref 5–15)
APTT PPP: 30.2 SECONDS (ref 22–39)
BASOPHILS # BLD AUTO: 0.02 10*3/MM3 (ref 0–0.2)
BASOPHILS NFR BLD AUTO: 0.3 % (ref 0–1.5)
BUN SERPL-MCNC: 10 MG/DL (ref 6–20)
BUN/CREAT SERPL: 14.5 (ref 7–25)
CALCIUM SPEC-SCNC: 8.9 MG/DL (ref 8.6–10.5)
CHLORIDE SERPL-SCNC: 106 MMOL/L (ref 98–107)
CO2 SERPL-SCNC: 26 MMOL/L (ref 22–29)
CREAT SERPL-MCNC: 0.69 MG/DL (ref 0.57–1)
CRP SERPL-MCNC: 0.73 MG/DL (ref 0–0.5)
DEPRECATED RDW RBC AUTO: 43 FL (ref 37–54)
EGFRCR SERPLBLD CKD-EPI 2021: 109.2 ML/MIN/1.73
EOSINOPHIL # BLD AUTO: 0.11 10*3/MM3 (ref 0–0.4)
EOSINOPHIL NFR BLD AUTO: 1.7 % (ref 0.3–6.2)
ERYTHROCYTE [DISTWIDTH] IN BLOOD BY AUTOMATED COUNT: 12.9 % (ref 12.3–15.4)
ERYTHROCYTE [SEDIMENTATION RATE] IN BLOOD: 15 MM/HR (ref 0–20)
GLUCOSE SERPL-MCNC: 106 MG/DL (ref 65–99)
HBA1C MFR BLD: 5.5 % (ref 4.8–5.6)
HCT VFR BLD AUTO: 40.1 % (ref 34–46.6)
HGB BLD-MCNC: 13 G/DL (ref 12–15.9)
IMM GRANULOCYTES # BLD AUTO: 0.02 10*3/MM3 (ref 0–0.05)
IMM GRANULOCYTES NFR BLD AUTO: 0.3 % (ref 0–0.5)
INR PPP: 0.98 (ref 0.84–1.13)
LYMPHOCYTES # BLD AUTO: 2.09 10*3/MM3 (ref 0.7–3.1)
LYMPHOCYTES NFR BLD AUTO: 32.6 % (ref 19.6–45.3)
MCH RBC QN AUTO: 29.2 PG (ref 26.6–33)
MCHC RBC AUTO-ENTMCNC: 32.4 G/DL (ref 31.5–35.7)
MCV RBC AUTO: 90.1 FL (ref 79–97)
MONOCYTES # BLD AUTO: 0.45 10*3/MM3 (ref 0.1–0.9)
MONOCYTES NFR BLD AUTO: 7 % (ref 5–12)
NEUTROPHILS NFR BLD AUTO: 3.73 10*3/MM3 (ref 1.7–7)
NEUTROPHILS NFR BLD AUTO: 58.1 % (ref 42.7–76)
NRBC BLD AUTO-RTO: 0 /100 WBC (ref 0–0.2)
PLATELET # BLD AUTO: 247 10*3/MM3 (ref 140–450)
PMV BLD AUTO: 8.6 FL (ref 6–12)
POTASSIUM SERPL-SCNC: 4.5 MMOL/L (ref 3.5–5.2)
PROTHROMBIN TIME: 12.9 SECONDS (ref 11.4–14.4)
QT INTERVAL: 398 MS
QTC INTERVAL: 420 MS
RBC # BLD AUTO: 4.45 10*6/MM3 (ref 3.77–5.28)
SODIUM SERPL-SCNC: 140 MMOL/L (ref 136–145)
WBC NRBC COR # BLD: 6.42 10*3/MM3 (ref 3.4–10.8)

## 2023-04-12 PROCEDURE — 36415 COLL VENOUS BLD VENIPUNCTURE: CPT

## 2023-04-12 PROCEDURE — 85610 PROTHROMBIN TIME: CPT

## 2023-04-12 PROCEDURE — 85025 COMPLETE CBC W/AUTO DIFF WBC: CPT

## 2023-04-12 PROCEDURE — 93005 ELECTROCARDIOGRAM TRACING: CPT

## 2023-04-12 PROCEDURE — 86140 C-REACTIVE PROTEIN: CPT

## 2023-04-12 PROCEDURE — 85652 RBC SED RATE AUTOMATED: CPT

## 2023-04-12 PROCEDURE — 80048 BASIC METABOLIC PNL TOTAL CA: CPT

## 2023-04-12 PROCEDURE — 83036 HEMOGLOBIN GLYCOSYLATED A1C: CPT

## 2023-04-12 PROCEDURE — 85730 THROMBOPLASTIN TIME PARTIAL: CPT

## 2023-04-12 RX ORDER — FAMOTIDINE 20 MG/1
20 TABLET, FILM COATED ORAL DAILY PRN
COMMUNITY

## 2023-04-12 NOTE — PAT
Prescription for Chlorhexidine shower called into patient's pharmacy or [facility] pharmacy by patient's surgeon.  Reinforced with patient to  the prescription from applicable pharmacy if they haven't already.  Verbal and written instructions given regarding proper use of Chlorhexidine body wash to patient and/or famlily during PAT visit. Patient/family also instructed to complete checklist and return it to Pre-op on the day of surgery.  Patient and/or family verbalized understanding.    Patient to apply Chlorhexadine wipes  to surgical area (as instructed) the night before procedure and the AM of procedure. Wipes provided.    Patient instructed to drink 20 ounces of Gatorade and it needs to be completed 1 hour (for Main OR patients) or 2 hours (scheduled  section & [facility] patients) before given arrival time for procedure (NO RED Gatorade)  Patient verbalized understanding.    Clean catch urinalysis not indicated because patient denied recent urinary frequency, urinary urgency, burning/pain upon urination, or flank pain. No recent UTIs.    Discussed with patient options for receiving total joint replacement education and assessed patient's ability and preference. Joint Replacement Guide given to patient during PAT visit since not received a copy within the last year. Encouraged patient/family to read guide thoroughly and notify PAT staff with any questions or concerns. Handout provided directing patient to links to watch online videos related to joint replacement surgery on the [facility] website. The handout gives detailed instructions for joining an online joint replacement class through Zoom or phone conference offered on . Patient agreed to participate by joining online class through Zoom. Patient verbalized understanding of instructions and to complete the online learning tool survey. Encouraged to share information with family and/or . An overview of the joint replacement education  was provided during the visit including general perioperative instructions that are routine for all surgical patients (PAT PASS, wipes, directions to pre-op, etc.).    Dr Giordano's office notified of orders to add for pre op

## 2023-04-14 ENCOUNTER — TELEPHONE (OUTPATIENT)
Dept: ORTHOPEDIC SURGERY | Facility: CLINIC | Age: 48
End: 2023-04-14

## 2023-04-14 NOTE — TELEPHONE ENCOUNTER
RT at bedside for breathing treatment      Tristin Scott RN  10/05/23 2052 Wanting to change fmla to work from home 2 weeks after surgery will fax new paperwork

## 2023-04-24 ENCOUNTER — ANESTHESIA EVENT (OUTPATIENT)
Dept: PERIOP | Facility: HOSPITAL | Age: 48
End: 2023-04-24
Payer: COMMERCIAL

## 2023-04-24 RX ORDER — FAMOTIDINE 10 MG/ML
20 INJECTION, SOLUTION INTRAVENOUS ONCE
Status: CANCELLED | OUTPATIENT
Start: 2023-04-24 | End: 2023-04-24

## 2023-04-24 RX ORDER — SODIUM CHLORIDE 0.9 % (FLUSH) 0.9 %
10 SYRINGE (ML) INJECTION EVERY 12 HOURS SCHEDULED
Status: CANCELLED | OUTPATIENT
Start: 2023-04-24

## 2023-04-25 ENCOUNTER — ANESTHESIA (OUTPATIENT)
Dept: PERIOP | Facility: HOSPITAL | Age: 48
End: 2023-04-25
Payer: COMMERCIAL

## 2023-04-25 ENCOUNTER — ANESTHESIA EVENT CONVERTED (OUTPATIENT)
Dept: ANESTHESIOLOGY | Facility: HOSPITAL | Age: 48
End: 2023-04-25
Payer: COMMERCIAL

## 2023-04-25 ENCOUNTER — APPOINTMENT (OUTPATIENT)
Dept: GENERAL RADIOLOGY | Facility: HOSPITAL | Age: 48
End: 2023-04-25
Payer: COMMERCIAL

## 2023-04-25 ENCOUNTER — HOSPITAL ENCOUNTER (OUTPATIENT)
Facility: HOSPITAL | Age: 48
Discharge: HOME | End: 2023-04-25
Attending: ORTHOPAEDIC SURGERY | Admitting: ORTHOPAEDIC SURGERY
Payer: COMMERCIAL

## 2023-04-25 VITALS
RESPIRATION RATE: 16 BRPM | DIASTOLIC BLOOD PRESSURE: 86 MMHG | SYSTOLIC BLOOD PRESSURE: 136 MMHG | OXYGEN SATURATION: 100 % | TEMPERATURE: 97.7 F | HEART RATE: 76 BPM

## 2023-04-25 DIAGNOSIS — Z96.651 S/P TKR (TOTAL KNEE REPLACEMENT), RIGHT: Primary | ICD-10-CM

## 2023-04-25 LAB
B-HCG UR QL: NEGATIVE
EXPIRATION DATE: NORMAL
INTERNAL NEGATIVE CONTROL: NORMAL
INTERNAL POSITIVE CONTROL: NORMAL
Lab: NORMAL
QT INTERVAL: 398 MS
QTC INTERVAL: 420 MS

## 2023-04-25 PROCEDURE — 25010000002 FENTANYL CITRATE (PF) 50 MCG/ML SOLUTION

## 2023-04-25 PROCEDURE — 25010000002 PROPOFOL 10 MG/ML EMULSION: Performed by: NURSE ANESTHETIST, CERTIFIED REGISTERED

## 2023-04-25 PROCEDURE — 97116 GAIT TRAINING THERAPY: CPT

## 2023-04-25 PROCEDURE — 20985 CPTR-ASST DIR MS PX: CPT | Performed by: ORTHOPAEDIC SURGERY

## 2023-04-25 PROCEDURE — S0260 H&P FOR SURGERY: HCPCS | Performed by: PHYSICIAN ASSISTANT

## 2023-04-25 PROCEDURE — 25010000002 ROPIVACAINE PER 1 MG: Performed by: NURSE ANESTHETIST, CERTIFIED REGISTERED

## 2023-04-25 PROCEDURE — C1755 CATHETER, INTRASPINAL: HCPCS | Performed by: ORTHOPAEDIC SURGERY

## 2023-04-25 PROCEDURE — 25010000002 DEXAMETHASONE SODIUM PHOSPHATE 10 MG/ML SOLUTION: Performed by: ANESTHESIOLOGY

## 2023-04-25 PROCEDURE — 73560 X-RAY EXAM OF KNEE 1 OR 2: CPT

## 2023-04-25 PROCEDURE — 97161 PT EVAL LOW COMPLEX 20 MIN: CPT

## 2023-04-25 PROCEDURE — 97110 THERAPEUTIC EXERCISES: CPT

## 2023-04-25 PROCEDURE — 27447 TOTAL KNEE ARTHROPLASTY: CPT | Performed by: ORTHOPAEDIC SURGERY

## 2023-04-25 PROCEDURE — C1776 JOINT DEVICE (IMPLANTABLE): HCPCS | Performed by: ORTHOPAEDIC SURGERY

## 2023-04-25 PROCEDURE — 25010000002 CEFAZOLIN IN DEXTROSE 2-4 GM/100ML-% SOLUTION: Performed by: ORTHOPAEDIC SURGERY

## 2023-04-25 PROCEDURE — C1713 ANCHOR/SCREW BN/BN,TIS/BN: HCPCS | Performed by: ORTHOPAEDIC SURGERY

## 2023-04-25 PROCEDURE — 25010000002 ONDANSETRON PER 1 MG: Performed by: NURSE ANESTHETIST, CERTIFIED REGISTERED

## 2023-04-25 PROCEDURE — 25010000002 DEXAMETHASONE PER 1 MG: Performed by: NURSE ANESTHETIST, CERTIFIED REGISTERED

## 2023-04-25 PROCEDURE — 25010000002 ROPIVACAINE PER 1 MG: Performed by: ORTHOPAEDIC SURGERY

## 2023-04-25 PROCEDURE — 27447 TOTAL KNEE ARTHROPLASTY: CPT | Performed by: PHYSICIAN ASSISTANT

## 2023-04-25 PROCEDURE — 25010000002 HYDROMORPHONE 1 MG/ML SOLUTION

## 2023-04-25 PROCEDURE — 81025 URINE PREGNANCY TEST: CPT | Performed by: ORTHOPAEDIC SURGERY

## 2023-04-25 PROCEDURE — 25010000002 FENTANYL CITRATE (PF) 50 MCG/ML SOLUTION: Performed by: NURSE ANESTHETIST, CERTIFIED REGISTERED

## 2023-04-25 PROCEDURE — 25010000002 MIDAZOLAM PER 1 MG: Performed by: NURSE ANESTHETIST, CERTIFIED REGISTERED

## 2023-04-25 DEVICE — DEV CONTRL TISS STRATAFIX SYMM PDS PLUS VIL CT-1 45CM: Type: IMPLANTABLE DEVICE | Site: KNEE | Status: FUNCTIONAL

## 2023-04-25 DEVICE — GEN II 7.5MM RESUR PAT 32MM
Type: IMPLANTABLE DEVICE | Site: KNEE | Status: FUNCTIONAL
Brand: GENESIS II

## 2023-04-25 DEVICE — LEGION CR HIGH FLEX XLPE SZ 5-6 10MM
Type: IMPLANTABLE DEVICE | Site: KNEE | Status: FUNCTIONAL
Brand: LEGION

## 2023-04-25 DEVICE — GENESIS II NON-POROUS TIBIAL                                    BASEPLATE SIZE 5 RIGHT
Type: IMPLANTABLE DEVICE | Site: KNEE | Status: FUNCTIONAL
Brand: GENESIS II

## 2023-04-25 DEVICE — LEGION NARROW CRUCIATE RETAINING                                    OXINIUM SIZE 6N RIGHT
Type: IMPLANTABLE DEVICE | Site: KNEE | Status: FUNCTIONAL
Brand: LEGION

## 2023-04-25 DEVICE — DEV CONTRL TISS STRATAFIX SPIRAL MNCRYL UD 3/0 PLS 60CM: Type: IMPLANTABLE DEVICE | Site: KNEE | Status: FUNCTIONAL

## 2023-04-25 DEVICE — IMPLANTABLE DEVICE: Type: IMPLANTABLE DEVICE | Site: KNEE | Status: FUNCTIONAL

## 2023-04-25 DEVICE — PALACOS® R IS A FAST-CURING, RADIOPAQUE, POLY(METHYL METHACRYLATE)-BASED BONE CEMENT.PALACOS ® R CONTAINS THE X-RAY CONTRAST MEDIUM ZIRCONIUM DIOXIDE. TO IMPROVE VISIBILITY IN THE SURGICAL FIELD PALACOS ® R HAS BEEN COLOURED WITH CHLOROPHYLL (E141). THE BONE CEMENT IS PREPARED DIRECTLY BEFORE USE BY MIXING A POLYMER POWDER COMPONENT WITH A LIQUID MONOMER COMPONENT. A DUCTILE DOUGH FORMS WHICH CURES WITHIN A FEW MINUTES.
Type: IMPLANTABLE DEVICE | Site: KNEE | Status: FUNCTIONAL
Brand: PALACOS®

## 2023-04-25 RX ORDER — ONDANSETRON 2 MG/ML
INJECTION INTRAMUSCULAR; INTRAVENOUS
Status: COMPLETED
Start: 2023-04-25 | End: 2023-04-25

## 2023-04-25 RX ORDER — ONDANSETRON 4 MG/1
4 TABLET, FILM COATED ORAL EVERY 6 HOURS PRN
Status: DISCONTINUED | OUTPATIENT
Start: 2023-04-25 | End: 2023-04-25 | Stop reason: HOSPADM

## 2023-04-25 RX ORDER — SODIUM CHLORIDE 0.9 % (FLUSH) 0.9 %
1-10 SYRINGE (ML) INJECTION AS NEEDED
Status: DISCONTINUED | OUTPATIENT
Start: 2023-04-25 | End: 2023-04-25 | Stop reason: HOSPADM

## 2023-04-25 RX ORDER — CEFAZOLIN SODIUM 2 G/100ML
2 INJECTION, SOLUTION INTRAVENOUS ONCE
Status: COMPLETED | OUTPATIENT
Start: 2023-04-25 | End: 2023-04-25

## 2023-04-25 RX ORDER — PROPOFOL 10 MG/ML
VIAL (ML) INTRAVENOUS CONTINUOUS PRN
Status: DISCONTINUED | OUTPATIENT
Start: 2023-04-25 | End: 2023-04-25 | Stop reason: SURG

## 2023-04-25 RX ORDER — FENTANYL CITRATE 50 UG/ML
INJECTION, SOLUTION INTRAMUSCULAR; INTRAVENOUS
Status: COMPLETED
Start: 2023-04-25 | End: 2023-04-25

## 2023-04-25 RX ORDER — TRANEXAMIC ACID 10 MG/ML
1000 INJECTION, SOLUTION INTRAVENOUS ONCE
Status: COMPLETED | OUTPATIENT
Start: 2023-04-25 | End: 2023-04-25

## 2023-04-25 RX ORDER — LABETALOL HYDROCHLORIDE 5 MG/ML
10 INJECTION, SOLUTION INTRAVENOUS EVERY 4 HOURS PRN
Status: DISCONTINUED | OUTPATIENT
Start: 2023-04-25 | End: 2023-04-25 | Stop reason: HOSPADM

## 2023-04-25 RX ORDER — PHENYLEPHRINE HCL IN 0.9% NACL 1 MG/10 ML
SYRINGE (ML) INTRAVENOUS AS NEEDED
Status: DISCONTINUED | OUTPATIENT
Start: 2023-04-25 | End: 2023-04-25 | Stop reason: SURG

## 2023-04-25 RX ORDER — FAMOTIDINE 20 MG/1
20 TABLET, FILM COATED ORAL ONCE
Status: COMPLETED | OUTPATIENT
Start: 2023-04-25 | End: 2023-04-25

## 2023-04-25 RX ORDER — PREGABALIN 150 MG/1
150 CAPSULE ORAL ONCE
Status: COMPLETED | OUTPATIENT
Start: 2023-04-25 | End: 2023-04-25

## 2023-04-25 RX ORDER — BUPIVACAINE HYDROCHLORIDE 2.5 MG/ML
INJECTION, SOLUTION EPIDURAL; INFILTRATION; INTRACAUDAL
Status: COMPLETED | OUTPATIENT
Start: 2023-04-25 | End: 2023-04-25

## 2023-04-25 RX ORDER — SODIUM CHLORIDE, SODIUM LACTATE, POTASSIUM CHLORIDE, CALCIUM CHLORIDE 600; 310; 30; 20 MG/100ML; MG/100ML; MG/100ML; MG/100ML
9 INJECTION, SOLUTION INTRAVENOUS CONTINUOUS
Status: DISCONTINUED | OUTPATIENT
Start: 2023-04-25 | End: 2023-04-25

## 2023-04-25 RX ORDER — ROPIVACAINE HYDROCHLORIDE 5 MG/ML
INJECTION, SOLUTION EPIDURAL; INFILTRATION; PERINEURAL AS NEEDED
Status: DISCONTINUED | OUTPATIENT
Start: 2023-04-25 | End: 2023-04-25 | Stop reason: HOSPADM

## 2023-04-25 RX ORDER — ONDANSETRON 2 MG/ML
4 INJECTION INTRAMUSCULAR; INTRAVENOUS EVERY 6 HOURS PRN
Status: DISCONTINUED | OUTPATIENT
Start: 2023-04-25 | End: 2023-04-25 | Stop reason: HOSPADM

## 2023-04-25 RX ORDER — HYDRALAZINE HYDROCHLORIDE 20 MG/ML
5 INJECTION INTRAMUSCULAR; INTRAVENOUS
Status: DISCONTINUED | OUTPATIENT
Start: 2023-04-25 | End: 2023-04-25 | Stop reason: HOSPADM

## 2023-04-25 RX ORDER — DEXAMETHASONE SODIUM PHOSPHATE 4 MG/ML
INJECTION, SOLUTION INTRA-ARTICULAR; INTRALESIONAL; INTRAMUSCULAR; INTRAVENOUS; SOFT TISSUE AS NEEDED
Status: DISCONTINUED | OUTPATIENT
Start: 2023-04-25 | End: 2023-04-25 | Stop reason: SURG

## 2023-04-25 RX ORDER — HYDROCODONE BITARTRATE AND ACETAMINOPHEN 5; 325 MG/1; MG/1
TABLET ORAL
Status: COMPLETED
Start: 2023-04-25 | End: 2023-04-25

## 2023-04-25 RX ORDER — FENTANYL CITRATE 50 UG/ML
50 INJECTION, SOLUTION INTRAMUSCULAR; INTRAVENOUS
Status: DISCONTINUED | OUTPATIENT
Start: 2023-04-25 | End: 2023-04-25 | Stop reason: HOSPADM

## 2023-04-25 RX ORDER — MAGNESIUM HYDROXIDE 1200 MG/15ML
LIQUID ORAL AS NEEDED
Status: DISCONTINUED | OUTPATIENT
Start: 2023-04-25 | End: 2023-04-25 | Stop reason: HOSPADM

## 2023-04-25 RX ORDER — DROPERIDOL 2.5 MG/ML
0.62 INJECTION, SOLUTION INTRAMUSCULAR; INTRAVENOUS
Status: DISCONTINUED | OUTPATIENT
Start: 2023-04-25 | End: 2023-04-25 | Stop reason: HOSPADM

## 2023-04-25 RX ORDER — DROPERIDOL 2.5 MG/ML
0.62 INJECTION, SOLUTION INTRAMUSCULAR; INTRAVENOUS ONCE AS NEEDED
Status: DISCONTINUED | OUTPATIENT
Start: 2023-04-25 | End: 2023-04-25 | Stop reason: HOSPADM

## 2023-04-25 RX ORDER — PROMETHAZINE HYDROCHLORIDE 25 MG/1
25 TABLET ORAL ONCE AS NEEDED
Status: DISCONTINUED | OUTPATIENT
Start: 2023-04-25 | End: 2023-04-25 | Stop reason: HOSPADM

## 2023-04-25 RX ORDER — ROPIVACAINE HYDROCHLORIDE 2 MG/ML
1 INJECTION, SOLUTION EPIDURAL; INFILTRATION; PERINEURAL CONTINUOUS
Start: 2023-04-25 | End: 2023-05-17

## 2023-04-25 RX ORDER — FENTANYL CITRATE 50 UG/ML
INJECTION, SOLUTION INTRAMUSCULAR; INTRAVENOUS AS NEEDED
Status: DISCONTINUED | OUTPATIENT
Start: 2023-04-25 | End: 2023-04-25 | Stop reason: SURG

## 2023-04-25 RX ORDER — SODIUM CHLORIDE 9 MG/ML
40 INJECTION, SOLUTION INTRAVENOUS AS NEEDED
Status: DISCONTINUED | OUTPATIENT
Start: 2023-04-25 | End: 2023-04-25 | Stop reason: HOSPADM

## 2023-04-25 RX ORDER — SODIUM CHLORIDE 0.9 % (FLUSH) 0.9 %
3 SYRINGE (ML) INJECTION EVERY 12 HOURS SCHEDULED
Status: DISCONTINUED | OUTPATIENT
Start: 2023-04-25 | End: 2023-04-25 | Stop reason: HOSPADM

## 2023-04-25 RX ORDER — NALOXONE HCL 0.4 MG/ML
0.4 VIAL (ML) INJECTION
Status: DISCONTINUED | OUTPATIENT
Start: 2023-04-25 | End: 2023-04-25 | Stop reason: HOSPADM

## 2023-04-25 RX ORDER — ACETAMINOPHEN 500 MG
1000 TABLET ORAL ONCE
Status: COMPLETED | OUTPATIENT
Start: 2023-04-25 | End: 2023-04-25

## 2023-04-25 RX ORDER — SODIUM CHLORIDE 0.9 % (FLUSH) 0.9 %
10 SYRINGE (ML) INJECTION AS NEEDED
Status: DISCONTINUED | OUTPATIENT
Start: 2023-04-25 | End: 2023-04-25 | Stop reason: HOSPADM

## 2023-04-25 RX ORDER — DEXAMETHASONE SODIUM PHOSPHATE 10 MG/ML
INJECTION, SOLUTION INTRAMUSCULAR; INTRAVENOUS
Status: COMPLETED | OUTPATIENT
Start: 2023-04-25 | End: 2023-04-25

## 2023-04-25 RX ORDER — LABETALOL HYDROCHLORIDE 5 MG/ML
INJECTION, SOLUTION INTRAVENOUS AS NEEDED
Status: DISCONTINUED | OUTPATIENT
Start: 2023-04-25 | End: 2023-04-25 | Stop reason: SURG

## 2023-04-25 RX ORDER — PROMETHAZINE HYDROCHLORIDE 25 MG/1
25 SUPPOSITORY RECTAL ONCE AS NEEDED
Status: DISCONTINUED | OUTPATIENT
Start: 2023-04-25 | End: 2023-04-25 | Stop reason: HOSPADM

## 2023-04-25 RX ORDER — SODIUM CHLORIDE 9 MG/ML
120 INJECTION, SOLUTION INTRAVENOUS CONTINUOUS
Status: DISCONTINUED | OUTPATIENT
Start: 2023-04-25 | End: 2023-04-25 | Stop reason: HOSPADM

## 2023-04-25 RX ORDER — DOCUSATE SODIUM 100 MG/1
100 CAPSULE, LIQUID FILLED ORAL 2 TIMES DAILY
Qty: 30 CAPSULE | Refills: 0 | Status: SHIPPED | OUTPATIENT
Start: 2023-04-25 | End: 2023-05-10

## 2023-04-25 RX ORDER — NALOXONE HCL 0.4 MG/ML
0.4 VIAL (ML) INJECTION AS NEEDED
Status: DISCONTINUED | OUTPATIENT
Start: 2023-04-25 | End: 2023-04-25 | Stop reason: HOSPADM

## 2023-04-25 RX ORDER — HYDROCODONE BITARTRATE AND ACETAMINOPHEN 5; 325 MG/1; MG/1
1 TABLET ORAL ONCE AS NEEDED
Status: DISCONTINUED | OUTPATIENT
Start: 2023-04-25 | End: 2023-04-25 | Stop reason: HOSPADM

## 2023-04-25 RX ORDER — ACETAMINOPHEN 500 MG
1000 TABLET ORAL EVERY 8 HOURS
Qty: 42 TABLET | Refills: 0 | Status: SHIPPED | OUTPATIENT
Start: 2023-04-25 | End: 2023-05-02

## 2023-04-25 RX ORDER — ROPIVACAINE HYDROCHLORIDE 2 MG/ML
INJECTION, SOLUTION EPIDURAL; INFILTRATION; PERINEURAL CONTINUOUS
Status: DISCONTINUED | OUTPATIENT
Start: 2023-04-25 | End: 2023-04-25 | Stop reason: HOSPADM

## 2023-04-25 RX ORDER — ONDANSETRON 2 MG/ML
INJECTION INTRAMUSCULAR; INTRAVENOUS AS NEEDED
Status: DISCONTINUED | OUTPATIENT
Start: 2023-04-25 | End: 2023-04-25 | Stop reason: SURG

## 2023-04-25 RX ORDER — LIDOCAINE HYDROCHLORIDE 10 MG/ML
0.5 INJECTION, SOLUTION EPIDURAL; INFILTRATION; INTRACAUDAL; PERINEURAL ONCE AS NEEDED
Status: COMPLETED | OUTPATIENT
Start: 2023-04-25 | End: 2023-04-25

## 2023-04-25 RX ORDER — LABETALOL HYDROCHLORIDE 5 MG/ML
5 INJECTION, SOLUTION INTRAVENOUS
Status: DISCONTINUED | OUTPATIENT
Start: 2023-04-25 | End: 2023-04-25 | Stop reason: HOSPADM

## 2023-04-25 RX ORDER — OXYCODONE HYDROCHLORIDE 5 MG/1
5 TABLET ORAL EVERY 4 HOURS PRN
Status: DISCONTINUED | OUTPATIENT
Start: 2023-04-25 | End: 2023-04-25 | Stop reason: HOSPADM

## 2023-04-25 RX ORDER — SODIUM CHLORIDE 0.9 % (FLUSH) 0.9 %
10 SYRINGE (ML) INJECTION EVERY 12 HOURS SCHEDULED
Status: DISCONTINUED | OUTPATIENT
Start: 2023-04-25 | End: 2023-04-25 | Stop reason: HOSPADM

## 2023-04-25 RX ORDER — MEPERIDINE HYDROCHLORIDE 25 MG/ML
12.5 INJECTION INTRAMUSCULAR; INTRAVENOUS; SUBCUTANEOUS
Status: DISCONTINUED | OUTPATIENT
Start: 2023-04-25 | End: 2023-04-25 | Stop reason: HOSPADM

## 2023-04-25 RX ORDER — MIDAZOLAM HYDROCHLORIDE 1 MG/ML
INJECTION INTRAMUSCULAR; INTRAVENOUS AS NEEDED
Status: DISCONTINUED | OUTPATIENT
Start: 2023-04-25 | End: 2023-04-25 | Stop reason: SURG

## 2023-04-25 RX ORDER — MELOXICAM 15 MG/1
15 TABLET ORAL DAILY
Status: DISCONTINUED | OUTPATIENT
Start: 2023-04-26 | End: 2023-04-25 | Stop reason: HOSPADM

## 2023-04-25 RX ORDER — ONDANSETRON 2 MG/ML
4 INJECTION INTRAMUSCULAR; INTRAVENOUS ONCE AS NEEDED
Status: DISCONTINUED | OUTPATIENT
Start: 2023-04-25 | End: 2023-04-25 | Stop reason: HOSPADM

## 2023-04-25 RX ORDER — IPRATROPIUM BROMIDE AND ALBUTEROL SULFATE 2.5; .5 MG/3ML; MG/3ML
3 SOLUTION RESPIRATORY (INHALATION) ONCE AS NEEDED
Status: DISCONTINUED | OUTPATIENT
Start: 2023-04-25 | End: 2023-04-25 | Stop reason: HOSPADM

## 2023-04-25 RX ORDER — KETAMINE HCL IN NACL, ISO-OSM 100MG/10ML
SYRINGE (ML) INJECTION AS NEEDED
Status: DISCONTINUED | OUTPATIENT
Start: 2023-04-25 | End: 2023-04-25 | Stop reason: SURG

## 2023-04-25 RX ORDER — SODIUM CHLORIDE 0.9 % (FLUSH) 0.9 %
3-10 SYRINGE (ML) INJECTION AS NEEDED
Status: DISCONTINUED | OUTPATIENT
Start: 2023-04-25 | End: 2023-04-25 | Stop reason: HOSPADM

## 2023-04-25 RX ORDER — MELOXICAM 15 MG/1
15 TABLET ORAL DAILY
Qty: 15 TABLET | Refills: 0 | Status: SHIPPED | OUTPATIENT
Start: 2023-04-25 | End: 2023-05-10

## 2023-04-25 RX ORDER — HYDROMORPHONE HYDROCHLORIDE 1 MG/ML
0.5 INJECTION, SOLUTION INTRAMUSCULAR; INTRAVENOUS; SUBCUTANEOUS
Status: DISCONTINUED | OUTPATIENT
Start: 2023-04-25 | End: 2023-04-25 | Stop reason: HOSPADM

## 2023-04-25 RX ORDER — EPHEDRINE SULFATE 50 MG/ML
INJECTION INTRAVENOUS AS NEEDED
Status: DISCONTINUED | OUTPATIENT
Start: 2023-04-25 | End: 2023-04-25 | Stop reason: SURG

## 2023-04-25 RX ORDER — FENTANYL CITRATE 0.05 MG/ML
50 INJECTION, SOLUTION INTRAMUSCULAR; INTRAVENOUS
Status: DISCONTINUED | OUTPATIENT
Start: 2023-04-25 | End: 2023-04-25 | Stop reason: HOSPADM

## 2023-04-25 RX ORDER — MIDAZOLAM HYDROCHLORIDE 1 MG/ML
1 INJECTION INTRAMUSCULAR; INTRAVENOUS
Status: DISCONTINUED | OUTPATIENT
Start: 2023-04-25 | End: 2023-04-25 | Stop reason: HOSPADM

## 2023-04-25 RX ORDER — MELOXICAM 15 MG/1
15 TABLET ORAL ONCE
Status: COMPLETED | OUTPATIENT
Start: 2023-04-25 | End: 2023-04-25

## 2023-04-25 RX ORDER — CEFAZOLIN SODIUM 2 G/100ML
2 INJECTION, SOLUTION INTRAVENOUS EVERY 8 HOURS
Status: DISCONTINUED | OUTPATIENT
Start: 2023-04-25 | End: 2023-04-25 | Stop reason: HOSPADM

## 2023-04-25 RX ORDER — OXYCODONE HYDROCHLORIDE 5 MG/1
5 TABLET ORAL EVERY 4 HOURS PRN
Qty: 40 TABLET | Refills: 0 | Status: SHIPPED | OUTPATIENT
Start: 2023-04-25 | End: 2023-05-05 | Stop reason: SDUPTHER

## 2023-04-25 RX ORDER — NALOXONE HCL 0.4 MG/ML
0.1 VIAL (ML) INJECTION
Status: DISCONTINUED | OUTPATIENT
Start: 2023-04-25 | End: 2023-04-25 | Stop reason: HOSPADM

## 2023-04-25 RX ORDER — BUPIVACAINE HYDROCHLORIDE 5 MG/ML
INJECTION, SOLUTION PERINEURAL
Status: COMPLETED | OUTPATIENT
Start: 2023-04-25 | End: 2023-04-25

## 2023-04-25 RX ADMIN — TRANEXAMIC ACID 1000 MG: 10 INJECTION, SOLUTION INTRAVENOUS at 07:39

## 2023-04-25 RX ADMIN — HYDROCODONE BITARTRATE AND ACETAMINOPHEN 1 TABLET: 5; 325 TABLET ORAL at 11:08

## 2023-04-25 RX ADMIN — Medication 0.5 MG: at 12:17

## 2023-04-25 RX ADMIN — DEXAMETHASONE SODIUM PHOSPHATE 2 MG: 10 INJECTION, SOLUTION INTRAMUSCULAR; INTRAVENOUS at 10:57

## 2023-04-25 RX ADMIN — OXYCODONE 5 MG: 5 TABLET ORAL at 15:20

## 2023-04-25 RX ADMIN — FENTANYL CITRATE 50 MCG: 50 INJECTION, SOLUTION INTRAMUSCULAR; INTRAVENOUS at 10:06

## 2023-04-25 RX ADMIN — ONDANSETRON 4 MG: 2 INJECTION INTRAMUSCULAR; INTRAVENOUS at 10:05

## 2023-04-25 RX ADMIN — DEXMEDETOMIDINE HYDROCHLORIDE 4 MCG: 100 INJECTION, SOLUTION INTRAVENOUS at 12:06

## 2023-04-25 RX ADMIN — BUPIVACAINE HYDROCHLORIDE 20 ML: 2.5 INJECTION, SOLUTION EPIDURAL; INFILTRATION; INTRACAUDAL at 10:57

## 2023-04-25 RX ADMIN — SODIUM CHLORIDE, POTASSIUM CHLORIDE, SODIUM LACTATE AND CALCIUM CHLORIDE 9 ML/HR: 600; 310; 30; 20 INJECTION, SOLUTION INTRAVENOUS at 06:26

## 2023-04-25 RX ADMIN — ACETAMINOPHEN 1000 MG: 500 TABLET ORAL at 06:26

## 2023-04-25 RX ADMIN — PREGABALIN 150 MG: 150 CAPSULE ORAL at 06:26

## 2023-04-25 RX ADMIN — Medication 100 MCG: at 08:25

## 2023-04-25 RX ADMIN — HYDROMORPHONE HYDROCHLORIDE 0.5 MG: 1 INJECTION, SOLUTION INTRAMUSCULAR; INTRAVENOUS; SUBCUTANEOUS at 12:17

## 2023-04-25 RX ADMIN — Medication 10 MG: at 09:28

## 2023-04-25 RX ADMIN — PROPOFOL 100 MCG/KG/MIN: 10 INJECTION, EMULSION INTRAVENOUS at 07:33

## 2023-04-25 RX ADMIN — DEXMEDETOMIDINE HYDROCHLORIDE 4 MCG: 100 INJECTION, SOLUTION INTRAVENOUS at 12:05

## 2023-04-25 RX ADMIN — CEFAZOLIN SODIUM 2 G: 2 INJECTION, SOLUTION INTRAVENOUS at 07:35

## 2023-04-25 RX ADMIN — Medication 100 MCG: at 08:34

## 2023-04-25 RX ADMIN — LIDOCAINE HYDROCHLORIDE 0.5 ML: 10 INJECTION, SOLUTION EPIDURAL; INFILTRATION; INTRACAUDAL; PERINEURAL at 06:26

## 2023-04-25 RX ADMIN — Medication 2 G: at 15:21

## 2023-04-25 RX ADMIN — DEXAMETHASONE SODIUM PHOSPHATE 8 MG: 4 INJECTION, SOLUTION INTRAMUSCULAR; INTRAVENOUS at 07:50

## 2023-04-25 RX ADMIN — Medication 10 MG: at 08:55

## 2023-04-25 RX ADMIN — Medication 1000 MG: at 10:18

## 2023-04-25 RX ADMIN — BUPIVACAINE HYDROCHLORIDE 1.8 ML: 5 INJECTION, SOLUTION PERINEURAL at 07:37

## 2023-04-25 RX ADMIN — SODIUM CHLORIDE, POTASSIUM CHLORIDE, SODIUM LACTATE AND CALCIUM CHLORIDE: 600; 310; 30; 20 INJECTION, SOLUTION INTRAVENOUS at 09:52

## 2023-04-25 RX ADMIN — TRANEXAMIC ACID 1000 MG: 10 INJECTION, SOLUTION INTRAVENOUS at 09:10

## 2023-04-25 RX ADMIN — Medication 100 MCG: at 08:42

## 2023-04-25 RX ADMIN — MIDAZOLAM HYDROCHLORIDE 2 MG: 1 INJECTION, SOLUTION INTRAMUSCULAR; INTRAVENOUS at 07:33

## 2023-04-25 RX ADMIN — LABETALOL HYDROCHLORIDE 5 MG: 5 INJECTION, SOLUTION INTRAVENOUS at 08:00

## 2023-04-25 RX ADMIN — Medication 100 MCG: at 08:30

## 2023-04-25 RX ADMIN — Medication 200 MCG: at 08:38

## 2023-04-25 RX ADMIN — FAMOTIDINE 20 MG: 20 TABLET, FILM COATED ORAL at 06:26

## 2023-04-25 RX ADMIN — DEXMEDETOMIDINE HYDROCHLORIDE 4 MCG: 100 INJECTION, SOLUTION INTRAVENOUS at 12:07

## 2023-04-25 RX ADMIN — DEXMEDETOMIDINE HYDROCHLORIDE 4 MCG: 100 INJECTION, SOLUTION INTRAVENOUS at 12:08

## 2023-04-25 RX ADMIN — EPHEDRINE SULFATE 75 MG: 50 INJECTION INTRAVENOUS at 08:40

## 2023-04-25 RX ADMIN — Medication 30 MG: at 08:02

## 2023-04-25 RX ADMIN — Medication 100 MCG: at 08:22

## 2023-04-25 RX ADMIN — FENTANYL CITRATE 50 MCG: 50 INJECTION, SOLUTION INTRAMUSCULAR; INTRAVENOUS at 10:05

## 2023-04-25 RX ADMIN — MELOXICAM 15 MG: 15 TABLET ORAL at 06:26

## 2023-04-25 RX ADMIN — BUPIVACAINE HYDROCHLORIDE 20 ML: 2.5 INJECTION, SOLUTION EPIDURAL; INFILTRATION; INTRACAUDAL at 08:58

## 2023-04-25 NOTE — ANESTHESIA PROCEDURE NOTES
Ipack Block    Pre-sedation assessment completed: 4/25/2023 10:56 AM    Patient reassessed immediately prior to procedure    Start time: 4/25/2023 10:57 AM  Reason for block: at surgeon's request and post-op pain management  Performed by  CRNA/CAA: Phoenix Adams, CRNA  Assisted by: Shala Joshua RN  Preanesthetic Checklist  Completed: patient identified, IV checked, site marked, risks and benefits discussed, surgical consent, monitors and equipment checked, pre-op evaluation and timeout performed  Prep:  Sterile barriers:cap, gloves, mask, sterile barriers and washed/disinfected hands  Prep: ChloraPrep  Patient monitoring: blood pressure monitoring, continuous pulse oximetry and EKG  Procedure  Performed under: local infiltration  Guidance:ultrasound guided    ULTRASOUND INTERPRETATION.  Using ultrasound guidance a 20 G gauge needle was placed in close proximity to the nerve, at which point, under ultrasound guidance anesthetic was injected in the area of the nerve and spread of the anesthesia was seen on ultrasound in close proximity thereto.  There were no abnormalities seen on ultrasound; a digital image was taken; and the patient tolerated the procedure with no complications. Images:still images obtained, printed/placed on chart    Laterality:right  Block Type:iPack  Injection Technique:single-shot  Needle Type:echogenic and short-bevel  Needle Gauge:20 G  Resistance on Injection: none    Medications Used: bupivacaine PF (MARCAINE) 0.25 % injection - Injection   20 mL - 4/25/2023 10:57:00 AM  dexamethasone sodium phosphate injection - Injection   2 mg - 4/25/2023 10:57:00 AM      Medications  Preservative Free Saline:10ml  Comment:                       Post Assessment  Injection Assessment: negative aspiration for heme, no paresthesia on injection and incremental injection  Patient Tolerance:comfortable throughout block  Complications:no  Additional Notes  A high-frequency linear transducer, with sterile  "cover, was placed in the popliteal fossa. The popliteal artery, medial and lateral epicondyles were visualized. The transducer was moved cephalad to visualize the shaft of the femur and popliteal artery. The space between the popliteal artery and the femur was targeted for this procedure. The insertion site was prepped in sterile fashion. Skin and cutaneous tissue was infiltrated with 1% Lidocaine 2-5 ml. Using ultrasound-guidance, a 20-gauge B-Diaz 4\" Ultraplex 360 non-stimulating echogenic needle was then inserted lateral to biceps femoris and advanced in-plane with ultrasound guidance. Preservative-free normal saline was utilized for hydro-dissection of tissue. Local anesthetic (LA) injection spread was visualized between the popliteal artery and femur filling this space from medial to lateral with incremental 5 ml injections. Aspiration every 5 ml to prevent intravascular injection. Injection was completed with negative aspiration of blood and negative intravascular injection. Injection pressures were normal with minimal resistance. Throughout the injection of the LA solution, the sciatic nerve components were visualized, and care was taken to keep any spread of the LA solution from reaching the sciatic nerve components.           "

## 2023-04-25 NOTE — H&P
Patient Name: Larissa Jones  MRN: [id number]  : 1975  DOS: 2023    Attending: Daren Giordano MD    Primary Care Provider: Dl Staples MD      Chief complaint: Right knee Pain.    Subjective   Patient is a pleasant 47 y.o. female presented for scheduled surgery by Dr. Giordano.    Per his note (The patient is a 47 y.o. female with debilitating right knee pain secondary to osteoarthritis with valgus alignment that failed to improve in spite of conservative treatment .  Options have been discussed at length with the patient and the patient has had an extended course of conservative treatment without long-term benefit. The patient has reached the point where the patient desires total knee arthroplasty surgery and understands the risks, benefits, and alternatives. Consent was obtained. Please see my office notes for details with regard to preoperative counseling and operative rationale.).    She underwent right total knee arthroplasty under spinal anesthesia, tolerated surgery well.  Adductor canal nerve block catheter was placed by acute pain service.    Seen in PACU postop, doing well, she had some dizziness with first ambulation with PT.  Pain control is acceptable, no nausea, vomiting, or shortness of breath.    She has no history of DVT or PE.     Allergies   Allergen Reactions   • Bee Venom Swelling   • Imitrex [Sumatriptan] Shortness Of Breath   • Poison Ivy Extract Hives          Medications Prior to Admission   Medication Sig Dispense Refill Last Dose   • Chlorhexidine Gluconate 4 % solution Shower daily with solution as directed for 5 days prior to surgery 237 mL 0 2023   • famotidine (PEPCID) 20 MG tablet Take 1 tablet by mouth Daily As Needed for Heartburn.   2023 at 1600   • ibuprofen (ADVIL,MOTRIN) 600 MG tablet Take 1 tablet by mouth Every 6 (Six) Hours As Needed for Mild Pain.   Past Week   • mupirocin (BACTROBAN) 2 % ointment Apply 1 application into the nostril(s) as  directed by provider 2 (Two) Times a Day. 22 g 0 Past Week         History:   Past Medical History:   Diagnosis Date   • Acid reflux    • Arthritis    • ASD (atrial septal defect)     Surgically repaired   • Gestational diabetes    • History of transfusion     possible at the age of 7 after ASD repair, no reaction, [facility]   • Hypothyroidism    • Migraines    • Psoriasis      Past Surgical History:   Procedure Laterality Date   • CARDIAC SURGERY      ASD repair   •  SECTION      x2   •  SECTION WITH TUBAL Bilateral 2017    Procedure:  SECTION REPEAT WITH TUBAL;  Surgeon: Kaela Blue MD;  Location: [facility] LABOR DELIVERY;  Service:    • FOOT SURGERY Left     hardware removed   • KNEE ARTHROSCOPY Right     , ,    • LAPAROSCOPIC CHOLECYSTECTOMY     • ORIF ELBOW FRACTURE Left    • ORIF FOOT FRACTURE Left      Family History   Problem Relation Age of Onset   • Stroke Mother    • Thyroid disease Mother    • Arthritis Father    • Heart attack Father    • Hyperlipidemia Father    • Breast cancer Maternal Grandmother      Social History     Tobacco Use   • Smoking status: Never   • Smokeless tobacco: Never   Vaping Use   • Vaping Use: Never used   Substance Use Topics   • Alcohol use: Yes     Comment: occasional   • Drug use: No   Works with the transplant team at [employer]    Review of Systems  Pertinent items are noted in HPI, all other systems reviewed and negative    Vital Signs  /74   Pulse 73   Temp 97.5 °F (36.4 °C) (Temporal)   Resp 16   LMP 2023 (Approximate)   SpO2 95%     Physical Exam:    General Appearance:    Alert, cooperative, in no acute distress   Head:    Normocephalic, without obvious abnormality, atraumatic   Eyes:            Lids and lashes normal, conjunctivae and sclerae normal, no   icterus, no pallor, corneas clear    Ears:    Ears appear intact with no abnormalities noted   Throat:   No oral lesions, no  thrush, oral mucosa moist   Neck:   No adenopathy, supple, trachea midline, no thyromegaly         Lungs:     Clear to auscultation,respirations regular, even and                   unlabored    Heart:    Regular rhythm and normal rate, normal S1 and S2, no       murmur, no gallop   Abdomen:     Normal bowel sounds, no masses, no organomegaly, soft        non-tender, non-distended, no guarding, no rebound                 tenderness   Genitalia:    Deferred   Extremities:  Right intact flexion and dorsiflexion bilateral feet LE, CDI dressing on knee, PNB cath present.    Pulses:   Pulses palpable and equal bilaterally   Skin:   No bleeding, bruising or rash   Neurologic:   Cranial nerves 2 - 12 grossly intact, Flexion and dorsiflexion intact bilateral feet.        I reviewed the patient's new clinical results.             Invalid input(s): NEUTOPHILPCT,  EOSPCT        Invalid input(s): LABALBU, PROT  Lab Results   Component Value Date    HGBA1C 5.50 04/12/2023        Latest Reference Range & Units 04/12/23 10:48   Glucose 65 - 99 mg/dL 106 (H)   Sodium 136 - 145 mmol/L 140   Potassium 3.5 - 5.2 mmol/L 4.5   CO2 22.0 - 29.0 mmol/L 26.0   Chloride 98 - 107 mmol/L 106   Anion Gap 5.0 - 15.0 mmol/L 8.0   Creatinine 0.57 - 1.00 mg/dL 0.69   BUN 6 - 20 mg/dL 10   BUN/Creatinine Ratio 7.0 - 25.0  14.5   Calcium 8.6 - 10.5 mg/dL 8.9   eGFR >60.0 mL/min/1.73 109.2   Hemoglobin A1C 4.80 - 5.60 % 5.50   C-Reactive Protein 0.00 - 0.50 mg/dL 0.73 (H)   Protime 11.4 - 14.4 Seconds 12.9   INR 0.84 - 1.13  0.98   PTT 22.0 - 39.0 seconds 30.2   WBC 3.40 - 10.80 10*3/mm3 6.42   RBC 3.77 - 5.28 10*6/mm3 4.45   Hemoglobin 12.0 - 15.9 g/dL 13.0   Hematocrit 34.0 - 46.6 % 40.1   RDW 12.3 - 15.4 % 12.9   MCV 79.0 - 97.0 fL 90.1   MCH 26.6 - 33.0 pg 29.2   MCHC 31.5 - 35.7 g/dL 32.4   MPV 6.0 - 12.0 fL 8.6   Platelets 140 - 450 10*3/mm3 247   RDW-SD 37.0 - 54.0 fl 43.0   (H): Data is abnormally high    Assessment and Plan:       Primary  osteoarthritis of right knee    S/P TKR (total knee replacement), right      Plan  1. PT/OT,  Weight bearing as tolerated right LE  2. Pain control-prns, ACB cath with ropivacaine infusion.  3. IS-encourage  4. DVT proph- Mechanicals and aspirin  5. Bowel regimen  6. Resume home medications as appropriate  7. DC planning for home    Patient is very motivated to work with physical therapy and achieve  mobility and pain control among other goals for possible discharge home later in the day.    We reviewed these goals and discussed with patient tracking  progress for the next few hours and if all is achieved to receive next antibiotic prophylactic dose and be discharged home.     We discussed medications and precriptions at time of discharge including DVT prophylaxis, pain control, and bowel regimen.  All questions were answered .    Patient expressed understanding and agreement.wy.        Dragon disclaimer:  Part of this encounter note is an electronic transcription/translation of spoken language to printed text. The electronic translation of spoken language may permit erroneous, or at times, nonsensical words or phrases to be inadvertently transcribed; Although I have reviewed the note for such errors, some may still exist.    Armando Castillo MD  04/25/23  14:17 EDT

## 2023-04-25 NOTE — PLAN OF CARE
Goal Outcome Evaluation:  Plan of Care Reviewed With: patient, spouse        Progress: no change  Outcome Evaluation: PT eval complete. Mobility initiated at 1330 and pt amb 20', further mobility limited by hypotension. Pt seen again at 1520. She was able to amb in halls with FWW and CGAx2. Pt navigated 3 steps with CGA and SPC. Activity limited by elevated pain. RN aware and managing. R knee ROM 25-65 degrees limited by elevated pain. HEP and precautions reviewed with pt. Recommend d/c home with assist and OPPT when medically appropriate. Pt is cleared to d/c today from PT standpoint.

## 2023-04-25 NOTE — ANESTHESIA PREPROCEDURE EVALUATION
Anesthesia Evaluation     Patient summary reviewed and Nursing notes reviewed   NPO Solid Status: > 8 hours  NPO Liquid Status: > 2 hours           Airway   Mallampati: I  TM distance: >3 FB  Neck ROM: full  No difficulty expected  Dental      Pulmonary     breath sounds clear to auscultation  Cardiovascular     ECG reviewed  Rhythm: regular  Rate: normal        Neuro/Psych  GI/Hepatic/Renal/Endo    (+) obesity,       Musculoskeletal     Abdominal    Substance History      OB/GYN          Other   arthritis,      ROS/Med Hx Other: ASD repair as a child; no adverse sequelae                  Anesthesia Plan    ASA 2     spinal     (ADD canal block)  intravenous induction     Anesthetic plan, risks, benefits, and alternatives have been provided, discussed and informed consent has been obtained with: patient.    Plan discussed with CRNA.        CODE STATUS:

## 2023-04-25 NOTE — CASE MANAGEMENT/SOCIAL WORK
Discharge Planning Assessment  [facility]     Patient Name: Larissa Jones  MRN: [id number]  Today's Date: 4/25/2023    Admit Date: 4/25/2023    Plan: Home   Discharge Needs Assessment    No documentation.                Discharge Plan     Row Name 04/25/23 1521       Plan    Plan Home    Patient/Family in Agreement with Plan yes    Plan Comments Spoke with Ms. Jones and  at the bedside. Ms. Jones lives with her  and 3 kids in [facility]. She is independent with ADL's. She has a walker at home. She has 3 stairs to get into the house. She does not use oxygen or Home Health. Her PCP is Dl Staples and she has [facility]. Spoke with Jimena with [facility] [phone] with referral to [facility]. The [place] office will contact patient for PT appointment. CM will continue to follow.              Continued Care and Services - Admitted Since 4/25/2023     Therapy Coordination complete.    Service Provider Request Status Selected Services Address Phone Fax Patient Preferred    [facility]  Selected Outpatient Physical Therapy [address] [place] [id number] [phone] --                           Barbie Quach RN

## 2023-04-25 NOTE — THERAPY EVALUATION
Patient Name: Larissa Jones  : 1975    MRN: [id number]                              Today's Date: 2023       Admit Date: 2023    Visit Dx:     ICD-10-CM ICD-9-CM   1. S/P TKR (total knee replacement), right  Z96.651 V43.65     Patient Active Problem List   Diagnosis   • History of  delivery, antepartum   • Family history of congenital heart defect   • Diet controlled gestational diabetes mellitus (GDM) in third trimester   • Status post repeat low transverse  section   • Primary osteoarthritis of right knee   • S/P TKR (total knee replacement), right     Past Medical History:   Diagnosis Date   • Acid reflux    • Arthritis    • ASD (atrial septal defect)     Surgically repaired   • Gestational diabetes    • History of transfusion     possible at the age of 7 after ASD repair, no reaction, [facility]   • Hypothyroidism    • Migraines    • Psoriasis      Past Surgical History:   Procedure Laterality Date   • CARDIAC SURGERY  1982    ASD repair   •  SECTION      x2   •  SECTION WITH TUBAL Bilateral 2017    Procedure:  SECTION REPEAT WITH TUBAL;  Surgeon: Kaela Blue MD;  Location: [facility] LABOR DELIVERY;  Service:    • FOOT SURGERY Left     hardware removed   • KNEE ARTHROSCOPY Right     , ,    • LAPAROSCOPIC CHOLECYSTECTOMY     • ORIF ELBOW FRACTURE Left    • ORIF FOOT FRACTURE Left       General Information     Row Name 23 1557          Physical Therapy Time and Intention    Document Type evaluation  -     Mode of Treatment physical therapy  -     Row Name 23 1557          General Information    Patient Profile Reviewed yes  -HP     Prior Level of Function min assist:;all household mobility;community mobility;gait;transfer;bed mobility;ADL's  -HP     Existing Precautions/Restrictions fall;other (see comments)  adductor canal nerve cath  -     Barriers to Rehab previous functional deficit  -HP      Row Name 04/25/23 1557          Living Environment    People in Home spouse  -     Row Name 04/25/23 1557          Home Main Entrance    Number of Stairs, Main Entrance three;other (see comments)  short, wide steps  -     Stair Railings, Main Entrance none  -     Row Name 04/25/23 1557          Stairs Within Home, Primary    Number of Stairs, Within Home, Primary none  -     Row Name 04/25/23 1557          Cognition    Orientation Status (Cognition) oriented x 3  -     Row Name 04/25/23 1557          Safety Issues, Functional Mobility    Safety Issues Affecting Function (Mobility) insight into deficits/self-awareness;awareness of need for assistance;impulsivity;safety precaution awareness  -     Impairments Affecting Function (Mobility) balance;endurance/activity tolerance;pain;strength;range of motion (ROM)  -           User Key  (r) = Recorded By, (t) = Taken By, (c) = Cosigned By    Initials Name Provider Type     Sabiha Grey, PT Physical Therapist               Mobility     Row Name 04/25/23 1558          Bed Mobility    Bed Mobility supine-sit;sit-supine  -     Supine-Sit [place] (Bed Mobility) modified independence  -     Sit-Supine [place] (Bed Mobility) modified independence  -     Comment, (Bed Mobility) Pt demonstrated good core strength while coming to sit EOB with minima UE use required.  -     Row Name 04/25/23 1558          Transfers    Comment, (Transfers) Pt performed STS with FWW and CGAx2. VC for pushing through BUE on bed instead of pulling up on walker. Pt encouraged to flex knee as tolerated to allow for better body mechanics when coming to stand.  -     Row Name 04/25/23 1553          Gait/Stairs (Locomotion)    [place] Level (Gait) contact guard;2 person assist  -     Assistive Device (Gait) walker, front-wheeled  -     Ambulated day of surgery or within 4 hours of PACU discharge yes  -     Distance in Feet (Gait) 250  -      Deviations/Abnormal Patterns (Gait) bilateral deviations;stride length decreased;gait speed decreased;base of support, narrow;weight shifting decreased  -     Bilateral Gait Deviations heel strike decreased;forward flexed posture  -     [place] Level (Stairs) contact guard;2 person assist  -HP     Assistive Device (Stairs) cane, straight  -HP     Handrail Location (Stairs) right side (ascending)  -HP     Number of Steps (Stairs) 3  -HP     Ascending Technique (Stairs) step-to-step  -HP     Descending Technique (Stairs) step-to-step  -HP     Comment, (Gait/Stairs) Pt amb in halls with FWW and CGAx2. Pt demonstrated step-through gait pattern with decreased weight acceptance on RLE that improved with repetition and encouragement. Pt demosntrated narrow JAYANT, decreased knee flexion, minimal heel strike, and forward flexed posture with heavy reliance on BUE for support. Gait deviations improved moderately with VC then regressed as pt fatigued. Pt navigated 3 short steps with SPC and single HR with CGA and VC for sequencing. Pt educated on use of FWW to navigate steps at home through verbal step-by-step breakdown accompanied by demonstration within room. Pt verbalized understanding and reported she has been navigating steps that way at home. Activity limited by fatigue and elevated pain. RN aware and managing  -     Row Name 04/25/23 0489          Mobility    Extremity Weight-bearing Status right lower extremity  -     Right Lower Extremity (Weight-bearing Status) weight-bearing as tolerated (WBAT)  -           User Key  (r) = Recorded By, (t) = Taken By, (c) = Cosigned By    Initials Name Provider Type     Sabiha Grey PT Physical Therapist               Obj/Interventions     Row Name 04/25/23 1604          Range of Motion Comprehensive    General Range of Motion lower extremity range of motion deficits identified  -     Comment, General Range of Motion R knee ROM 25-65; ROM limited by pain  -      Row Name 04/25/23 1604          Strength Comprehensive (MMT)    General Manual Muscle Testing (MMT) Assessment lower extremity strength deficits identified  -     Comment, General Manual Muscle Testing (MMT) Assessment Pt able to perform B active ankle DF and SLR without extensor lag  -     Row Name 04/25/23 1604          Motor Skills    Therapeutic Exercise knee;ankle  -[facility] Name 04/25/23 1604          Knee (Therapeutic Exercise)    Knee (Therapeutic Exercise) strengthening exercise;isometric exercises  -     Knee Isometrics (Therapeutic Exercise) right;quad sets;10 repetitions  -     Knee Strengthening (Therapeutic Exercise) right;SLR (straight leg raise);SAQ (short arc quad);LAQ (long arc quad);heel slides;3 repetitions  -[facility] Name 04/25/23 1604          Ankle (Therapeutic Exercise)    Ankle (Therapeutic Exercise) AROM (active range of motion)  -     Ankle AROM (Therapeutic Exercise) bilateral;dorsiflexion;plantarflexion;10 repetitions  -[facility] Name 04/25/23 1604          Balance    Balance Assessment sitting static balance;sitting dynamic balance;sit to stand dynamic balance;standing static balance;standing dynamic balance  -     Static Sitting Balance standby assist  -     Dynamic Sitting Balance standby assist  -     Position, Sitting Balance sitting edge of bed  -     Static Standing Balance contact guard  -     Dynamic Standing Balance contact guard  -     Position/Device Used, Standing Balance supported;walker, rolling  -     Balance Interventions sitting;standing;sit to stand;occupation based/functional task  -[facility] Name 04/25/23 1604          Sensory Assessment (Somatosensory)    Sensory Assessment (Somatosensory) LE sensation intact  -           User Key  (r) = Recorded By, (t) = Taken By, (c) = Cosigned By    Initials Name Provider Type     Sabiha Grey, INES Physical Therapist               Goals/Plan     [facility] Name 04/25/23 1611          Transfer Goal 1  (PT)    Activity/Assistive Device (Transfer Goal 1, PT) sit-to-stand/stand-to-sit  -HP     [place] Level/Cues Needed (Transfer Goal 1, PT) modified independence  -HP     Time Frame (Transfer Goal 1, PT) long term goal (LTG);3 days  -HP     Progress/Outcome (Transfer Goal 1, PT) goal ongoing  -     Row Name 04/25/23 1611          Gait Training Goal 1 (PT)    Activity/Assistive Device (Gait Training Goal 1, PT) gait (walking locomotion)  -HP     [place] Level (Gait Training Goal 1, PT) modified independence  -HP     Distance (Gait Training Goal 1, PT) 500  -HP     Time Frame (Gait Training Goal 1, PT) long term goal (LTG);3 days  -HP     Progress/Outcome (Gait Training Goal 1, PT) goal ongoing  -     Row Name 04/25/23 1611          ROM Goal 1 (PT)    ROM Goal 1 (PT) R knee ROM 0-90  -HP     Time Frame (ROM Goal 1, PT) long-term goal (LTG);3 days  -HP     Progress/Outcome (ROM Goal 1, PT) goal ongoing  -     Row Name 04/25/23 1611          Stairs Goal 1 (PT)    Activity/Assistive Device (Stairs Goal 1, PT) ascending stairs;descending stairs  -HP     [place] Level/Cues Needed (Stairs Goal 1, PT) modified independence  -HP     Number of Stairs (Stairs Goal 1, PT) 3  -HP     Time Frame (Stairs Goal 1, PT) long term goal (LTG);3 days  -HP     Progress/Outcome (Stairs Goal 1, PT) goal ongoing  -     Row Name 04/25/23 1611          Therapy Assessment/Plan (PT)    Planned Therapy Interventions (PT) balance training;bed mobility training;gait training;home exercise program;patient/family education;transfer training;stretching;strengthening;stair training;ROM (range of motion)  -           User Key  (r) = Recorded By, (t) = Taken By, (c) = Cosigned By    Initials Name Provider Type    HP Sabiha Grey, PT Physical Therapist               Clinical Impression     Row Name 04/25/23 1606          Pain    Pretreatment Pain Rating 8/10  -HP     Posttreatment Pain Rating 8/10  -HP     Pain Location -  Side/Orientation Right  -HP     Pain Location anterior  -HP     Pain Location - knee  -HP     Pain Intervention(s) Repositioned;Cold applied;Ambulation/increased activity;Elevated  -HP     Row Name 04/25/23 1606          Plan of Care Review    Plan of Care Reviewed With patient;spouse  -HP     Progress no change  -HP     Outcome Evaluation PT eval complete. Mobility initiated at 1330 and pt amb 20', further mobility limited by hypotension. Pt seen again at 1520. She was able to amb in halls with FWW and CGAx2. Pt navigated 3 steps with CGA and SPC. Activity limited by elevated pain. RN aware and managing. R knee ROM 25-65 degrees limited by elevated pain. HEP and precautions reviewed with pt. Recommend d/c home with assist and OPPT when medically appropriate. Pt is cleared to d/c today from PT standpoint.  -     Row Name 04/25/23 1606          Therapy Assessment/Plan (PT)    Rehab Potential (PT) good, to achieve stated therapy goals  -HP     Criteria for Skilled Interventions Met (PT) yes;meets criteria;skilled treatment is necessary  -     Therapy Frequency (PT) 2 times/day  -     Row Name 04/25/23 1606          Vital Signs    Pre Systolic BP Rehab 115  VSS throughout 1520 session; BP recorded from initial session.  -HP     Pre Treatment Diastolic BP 78  -HP     Intra Systolic BP Rehab 90  -HP     Intra Treatment Diastolic BP 53  -HP     Post Systolic BP Rehab 110  -HP     Post Treatment Diastolic BP 76  -HP     Pre Patient Position Supine  -HP     Intra Patient Position Standing  -HP     Post Patient Position Sitting  -HP     Row Name 04/25/23 1606          Positioning and Restraints    Pre-Treatment Position in bed  -HP     Post Treatment Position chair  -HP     In Chair notified nsg;reclined;sitting;call light within reach;encouraged to call for assist;exit alarm on;legs elevated  -HP           User Key  (r) = Recorded By, (t) = Taken By, (c) = Cosigned By    Initials Name Provider Type    NAS Grey  Sabiha, PT Physical Therapist               Outcome Measures     Row Name 04/25/23 1611          How much help from another person do you currently need...    Turning from your back to your side while in flat bed without using bedrails? 4  -HP     Moving from lying on back to sitting on the side of a flat bed without bedrails? 4  -HP     Moving to and from a bed to a chair (including a wheelchair)? 3  -HP     Standing up from a chair using your arms (e.g., wheelchair, bedside chair)? 3  -HP     Climbing 3-5 steps with a railing? 3  -HP     To walk in hospital room? 3  -HP     AM-PAC 6 Clicks Score (PT) 20  -HP     Highest level of mobility 6 --> Walked 10 steps or more  -     Row Name 04/25/23 1611          PADD    Diagnosis 1  -HP     Gender 1  -HP     Age Group 4  -HP     Gait Distance 1  -HP     Assist Level 1  -HP     Home Support 3  -HP     PADD Score 9  -HP     Patient Preference home with outpatient rehab  -HP     Prediction by PADD Score directly home (with home health or out-patient rehab)  -     Row Name 04/25/23 1611          Functional Assessment    Outcome Measure Options AM-PAC 6 Clicks Basic Mobility (PT);PADD  -HP           User Key  (r) = Recorded By, (t) = Taken By, (c) = Cosigned By    Initials Name Provider Type     Sabiha Grey, PT Physical Therapist                             Physical Therapy Education     Title: PT OT SLP Therapies (Done)     Topic: Physical Therapy (Done)     Point: Mobility training (Done)     Learning Progress Summary           Patient Acceptance, E,D, VU,DU by  at 4/25/2023 1612                   Point: Home exercise program (Done)     Learning Progress Summary           Patient Acceptance, E,D, VU,DU by  at 4/25/2023 1612                   Point: Body mechanics (Done)     Learning Progress Summary           Patient Acceptance, E,D, VU,DU by  at 4/25/2023 1612                   Point: Precautions (Done)     Learning Progress Summary           Patient  Acceptance, E,D, VU,DU by  at 4/25/2023 1612                               User Key     Initials Effective Dates Name Provider Type Discipline     06/01/21 -  Sabiha Grey PT Physical Therapist PT              PT Recommendation and Plan  Planned Therapy Interventions (PT): balance training, bed mobility training, gait training, home exercise program, patient/family education, transfer training, stretching, strengthening, stair training, ROM (range of motion)  Plan of Care Reviewed With: patient, spouse  Progress: no change  Outcome Evaluation: PT eval complete. Mobility initiated at 1330 and pt amb 20', further mobility limited by hypotension. Pt seen again at 1520. She was able to amb in halls with FWW and CGAx2. Pt navigated 3 steps with CGA and SPC. Activity limited by elevated pain. RN aware and managing. R knee ROM 25-65 degrees limited by elevated pain. HEP and precautions reviewed with pt. Recommend d/c home with assist and OPPT when medically appropriate. Pt is cleared to d/c today from PT standpoint.     Time Calculation:    PT Charges     Row Name 04/25/23 1331             Time Calculation    Start Time 1331  -HP      PT Received On 04/25/23  -      PT Goal Re-Cert Due Date 05/05/23  -         Timed Charges    49748 - PT Therapeutic Exercise Minutes 10  -HP      37706 - Gait Training Minutes  10  -      10365 - PT Therapeutic Activity Minutes 5  -HP         Untimed Charges    PT Eval/Re-eval Minutes 47  -HP         Total Minutes    Timed Charges Total Minutes 25  -HP      Untimed Charges Total Minutes 47  -HP       Total Minutes 72  -HP            User Key  (r) = Recorded By, (t) = Taken By, (c) = Cosigned By    Initials Name Provider Type     Sabiha Grey PT Physical Therapist              Therapy Charges for Today     Code Description Service Date Service Provider Modifiers Qty    17582706487 HC PT THER PROC EA 15 MIN 4/25/2023 Sabiha Grey, PT GP 1    84085884390 HC GAIT TRAINING EA  15 MIN 4/25/2023 Sabiha Grey, PT GP 1    18529148228 HC PT EVAL LOW COMPLEXITY 4 4/25/2023 Sabiha Grey, PT GP 1    24092430845 HC PT THER SUPP EA 15 MIN 4/25/2023 Sabiha Grey, PT GP 4          PT G-Codes  Outcome Measure Options: AM-PAC 6 Clicks Basic Mobility (PT), PADD  AM-PAC 6 Clicks Score (PT): 20  PT Discharge Summary  Anticipated Discharge Disposition (PT): home with assist, home with outpatient therapy services    Sabiha Grey, PT  4/25/2023

## 2023-04-25 NOTE — ANESTHESIA POSTPROCEDURE EVALUATION
Patient: Larissa Jones    Procedure Summary     Date: 04/25/23 Room / Location:  [facility]    Anesthesia Start: 0730 Anesthesia Stop: 1000    Procedure: TOTAL KNEE ARTHROPLASTY - RIGHT (Right: Knee) Diagnosis:       Primary osteoarthritis of right knee      (Primary osteoarthritis of right knee [M17.11])    Surgeons: Daren Giordano MD Provider: Suresh Godwin Jr., MD    Anesthesia Type: spinal ASA Status: 2          Anesthesia Type: spinal    Vitals  Vitals Value Taken Time   /73 04/25/23 1430   Temp 97.5 °F (36.4 °C) 04/25/23 1200   Pulse 74 04/25/23 1440   Resp 16 04/25/23 1400   SpO2 100 % 04/25/23 1440   Vitals shown include unvalidated device data.        Post Anesthesia Care and Evaluation    Patient location during evaluation: PACU  Patient participation: complete - patient participated  Level of consciousness: awake and alert  Pain management: adequate    Airway patency: patent  Anesthetic complications: No anesthetic complications  PONV Status: none  Cardiovascular status: hemodynamically stable and acceptable  Respiratory status: nonlabored ventilation and acceptable  Hydration status: acceptable

## 2023-04-25 NOTE — DISCHARGE INSTRUCTIONS
EXOFIN CARING FOR YOUR WOUND    AFTER exofin Fusion SKIN CLOSURE SYSTEM HAS BEEN APPLIED    YOUR HEALTHCARE PROFESSIONAL HAS CHOSEN TO USE exofin Fusion SKIN CLOSURE SYSTEM TO CLOSE YOUR WOUND.    exocin Fusion is the combination of a mesh and liquid adhesive that allows the incision or wound to be held together during the healing process.    exocin Fusion should remain in place until your healthcare professional; has determined that adequate healing has occurred, which is usually anywhere between 7 to 14 days. In most cases, exofin Fusion is easily removed with little or no discomfort.    In the event that you notice that exofin Fusion is beginning to loosen or may be coming off, contact your healthcare professional.    The following information is provided to help you understand how to care for your incision and is based on the FDA-cleared product labeling.    You should always follow the instructions of your healthcare provider.    THINGS TO KNOW    BATHING OR SHOWERING    If directed by your healthcare professional, you may occasionally and briefly wet your incision or wound that was treated with exofin Fusion in the shower or bath. Do not soak or scrub your incision or wound. Do not swim or soak your incision or wound in water. After showering or bathing, gently blot your incision or wound dry with a soft towel. If a dry protective dressing is being used over exofin Fusion, it should be replaced with a fresh, dry protective dressing after showering or bathing as directed by your healthcare practitioner. Care should also be taken so that any tape that may be part of the dry protective dressing does not come into contact with exofin Fusion because when the tape is removed, it may also remove exofin Fusion.    WOUND HEALING  If you experience any redness, swelling, discomfort, warmth or pus, contact your healthcare professional and he or she will determine how your incision or wound is healing and take the  necessary steps to address any issues.    EXERCISE  Do not engage in strenuous exercise that may cause additional stress on your incision or wound. Follow your healthcare professional's guidance about when you can return to your normal activities.    OINTMENTS OR LIQUIDS  Topical ointments, liquids or any other products (other than dry bandages) should not be applied to the incision while exofin Fusion is in place. These may loosen exofin Fusion from the skin before it has completely healed.    REMOVING exofin Fusion  Your healthcare professional will determine when the healing process of your incision or wound has been completed and exofin Fusion is ready to be removed, which is usually between 7 to 14 days. When healing is complete, your healthcare professional will carefully peel off the exofin Fusion.    Prior to removal, do not scratch, rub or pick at the mesh. This may loosen the adhesive and mesh before the skin is healed.  In the event that you notice the exofin Fusion is beginning to loosen and may be coming off or comes off with the skin/wound, contract your healthcare professional.    For complete directions on the use of exofin Fusion, please refer to instructions for Use (IFU) included in the product packaging.  IF YOU HAVE ANY QUESTIONS OR CONCERNS ABOUT exofin Fusion PLEASE CONTACT YOUR HEALTHCARE PROFESSIONAL. InfuBLOCK - Patient Information    What is a pain pump?  InfuBLOCK is a postoperative, non-narcotic pain relief system that delivers local anesthetic to or near the surgical site. This is a pain minimizing therapy that delivers an anesthetic (numbing) medicine to the nerve.    The InfuBLOCK pain pump will continuously deliver a local anesthetic medication to block the pain in the area of your procedure.    Where can I find information about my pain pump?           For more information about your pain pump, scan the QR code.  For additional patient resources, visit  [email or web address].                                                                                             The InfuSystem Nursing Hotline is Here for You 24/7.     Call [phone] for Assistance.  While your physician is your primary source for information about your treatment., there may be times during your treatment that you need assistance with your infusion pump. Our team of compassionate and knowledgeable Registered Nursed (RN) is here to assist every step of the way.    Answers to questions about your infusion pump                 Tubing disconnect  Assistance with pump alarms                                                      Dislodged catheter  Excessive leakage noted from pump                                         Inadequate pain control   Nerve Catheter Removal Instructions  When your device is empty:    Remove your catheter by pulling the dressing off slowly (like you would remove a regular bandage). The catheter should pull right out of the skin.  Check that the BLUE tip is intact.                                                                                     If the catheter is stuck, reposition your   extremity and pull slowly until removed.  *If catheter is HURTING and WON'T come out, stop and call [phone] for further assistance.    Remove medication bag from the black carrying case.  Cut the tubing on right and left side of pump, and discard the medication bag and tubing into garbage.  Place the pump and black carrying case into the plastic bag and then place this into the return box.  Seal box with blue stickers and return to [employer] postal service.    THIS IS PRE-PAID POSTAGE. [facility] COLD THERAPY - PATIENT INSTRUCTION SHEET    Cold Compression Therapy for your comfort and rehabilitation  Your caregivers want you to be productive in your rehab and comfortable during your stay. In keeping with those goals, you will be receiving an [facility] Cold Therapy Wrap to help  ease post-operative pain and swelling that might keep you from getting back on track! Your SMI Cold Therapy Wrap is effective and simple-to-use, and you will be encouraged to apply it throughout your hospital stay and at home through the duration of your recovery.    When you are ready to go home  Be sure to take your SMI Cold Therapy Wrap and both sets of Gel Bags with you for continued comfort and use throughout your rehabilitation. If you don't already have them, ask your nurse or aide to retrieve your SMI Gel Bags from the patient freezer.    Home use precautions  Always follow your medical professional's application instructions upon discharge. Your SMI Cold Therapy Wrap and Gel Bags are designed to last for months following your surgery. Never heat the Gel Bags unless specified by your healthcare provider. Supervision is advised when using this product on children or geriatric patients. To avoid danger of suffocation, please keep the outer plastic packaging away from children & pets.    Cold Therapy Instructions  Place Gel Bags in a freezer set ¾ of the way to max temperature for at least (4) hours. For best results, lay the Gel Bags flat and [id number] in the freezer. Once frozen, slide Gel Bags into the gel pouch and secure your wrap to the affected area with the straps.  Gel wraps that have been stored in a freezer for an extended period of time may require a (10) minute period of softening up in a room temperature environment before application.  The gel pouch acts as a protective barrier. NEVER place frozen bags directly onto skin, as this may cause frostbite injury.  The SMI Cold Therapy Wrap is designed to be able to be worm while ambulating. The compression straps can be secured well enough so that the Wrap won't fall off while moving.  Wrap Application Videos can be viewed at [email or web address].  An additional protective barrier such as clothing, a washcloth, hand-towel or pillowcase may be  used during prolonged treatment applications.  The Gel-Pouch and Wrap are both Latex-Free and the Gel Bag ingredients are non toxic.    SMI Wrap care instructions  The [facility] Cold Therapy Wrap may be hand washed and hung to dry when needed.    [facility] re-order information  Additional [facility] body specific wraps and/or Gel Bags can be re-ordered from [email or web address] or call [phone] ([phone])       You may remove your ace wrap and soft roll tomorrow a.m.  After you remove it please place your compression sleeve and wear this continually   except for bathing until your follow up with Dr. Giordano and then as directed.

## 2023-04-25 NOTE — ANESTHESIA PROCEDURE NOTES
Adductor Canal Block      Patient reassessed immediately prior to procedure    Reason for block: at surgeon's request and post-op pain management  Performed by  CRNA/CAA: Anuel Babb, CRNA  Assisted by: Shala Joshua RN  Preanesthetic Checklist  Completed: patient identified, IV checked, site marked, risks and benefits discussed, surgical consent, monitors and equipment checked, pre-op evaluation and timeout performed  Prep:  Pt Position: supine  Sterile barriers:cap, gloves, mask, sterile barriers and washed/disinfected hands  Prep: ChloraPrep  Patient monitoring: blood pressure monitoring, continuous pulse oximetry and EKG  Procedure    Sedation: no  Performed under: spinal  Guidance:ultrasound guided    ULTRASOUND INTERPRETATION.  Using ultrasound guidance a 20 G gauge needle was placed in close proximity to the nerve, at which point, under ultrasound guidance anesthetic was injected in the area of the nerve and spread of the anesthesia was seen on ultrasound in close proximity thereto.  There were no abnormalities seen on ultrasound; a digital image was taken; and the patient tolerated the procedure with no complications. Images:still images obtained, printed/placed on chart    Laterality:right  Block Type:adductor canal block  Injection Technique:catheter  Needle Type:Tuohy and echogenic  Needle Gauge:18 G  Resistance on Injection: none  Catheter Size:20 G (20g)  Cath Depth at skin: 10 cm    Medications Used: bupivacaine PF (MARCAINE) 0.25 % injection - Injection   20 mL - 4/25/2023 8:58:00 AM      Medications  Preservative Free Saline:10ml    Post Assessment  Injection Assessment: negative aspiration for heme, incremental injection and no paresthesia on injection  Patient Tolerance:comfortable throughout block  Complications:no  Additional Notes  CATHETER   A high-frequency linear transducer, with sterile cover, was placed on the anterior mid-thigh (between the anterior superior iliac spine and patella).  "The transducer was then moved medially to identify the Sartorius muscle (Mat), Vastus Medialis muscle (VMM), Superficial Femoral Artery (SFA) and Vein. The transducer was then moved cephalad or caudad to position the SFA in the middle of the Mat. The insertion site was prepped and draped in sterile fashion. Skin and cutaneous tissue was infiltrated with 2-5 ml of 1% Lidocaine. Using ultrasound-guidance, an 18-gauge CloudBeesiplex Ultra 360 Touhy needle was advanced in plane from lateral to medial. Preservative-free normal saline was utilized for hydro-dissection of tissue, advancement of Touhy, and to confirm needle placement below the fascial plane of the Mat where the Nerve to the VMM is located. Local anesthetic (LA) 5 ml deposited here. The Touhy needle continues its path lateral to the SFA at the level of the Saphenous Nerve. The remainder of the LA was deposited at the 10-11 o'clock position of the SFA. This injection created a space between the Mat and the SFA. Aspiration every 5 ml to prevent intravascular injection. Injection was completed with negative aspiration of blood and negative intravascular injection. Injection pressures were normal with minimal resistance. A 20-gauge CloudBeesiplex Echo catheter was placed through the needle and advance out the tip of the Touhy 3-5 cm anterior to the SFA. The Touhy needle was then removed, and final catheter position verified at the 12 o'clock position to the SFA. The catheter was secured in the usual fashion with skin glue, benzoin, steri-strips, CHG tegaderm and label noting \"Nerve Block Catheter\". Jerk tape applied at yellow connector and catheter connection.           "

## 2023-04-25 NOTE — H&P
"Pre-Op H&P  Larissa Jones  [id number]  1975    Chief complaint: \"Right knee pain\"    HPI:    Patient is a 47 y.o.female who presents with progressive, lifestyle limiting, moderate to severe right knee pain.  She has failed conservative therapy.  Including a tincture of time, nonsteroidal anti-inflammatories, physical therapy, bracing.  She is exhausted conservative therapy.  Pain is worse on a regular basis.  Worse with activity and relieved by rest.  He is now admitted for surgical intervention.    Review of Systems:  General ROS: negative for chills, fever or skin lesions;  No changes since last office visit.  Neg for recent sick exposure  Cardiovascular ROS: no chest pain or dyspnea on exertion  Respiratory ROS: no cough, shortness of breath, or wheezing    Allergies:   Allergies   Allergen Reactions   • Bee Venom Swelling   • Imitrex [Sumatriptan] Shortness Of Breath   • Poison Ivy Extract Hives       Home Meds:    No current facility-administered medications on file prior to encounter.     Current Outpatient Medications on File Prior to Encounter   Medication Sig Dispense Refill   • Chlorhexidine Gluconate 4 % solution Shower daily with solution as directed for 5 days prior to surgery 237 mL 0   • ibuprofen (ADVIL,MOTRIN) 600 MG tablet Take 1 tablet by mouth Every 6 (Six) Hours As Needed for Mild Pain.     • mupirocin (BACTROBAN) 2 % ointment Apply 1 application into the nostril(s) as directed by provider 2 (Two) Times a Day. 22 g 0       PMH:   Past Medical History:   Diagnosis Date   • Acid reflux    • Arthritis    • ASD (atrial septal defect)     Surgically repaired   • Gestational diabetes    • History of transfusion     possible at the age of 7 after ASD repair, no reaction, [facility]   • Hypothyroidism    • Migraines    • Psoriasis      PSH:    Past Surgical History:   Procedure Laterality Date   • CARDIAC SURGERY      ASD repair   •  SECTION      x2   •  SECTION WITH " TUBAL Bilateral 2017    Procedure:  SECTION REPEAT WITH TUBAL;  Surgeon: Kaela Blue MD;  Location: [facility] LABOR DELIVERY;  Service:    • FOOT SURGERY Left     hardware removed   • KNEE ARTHROSCOPY Right     , ,    • LAPAROSCOPIC CHOLECYSTECTOMY     • ORIF ELBOW FRACTURE Left    • ORIF FOOT FRACTURE Left      Patient denies allergy to contrast dye or latex immunization History:  Influenza: Yes  Pneumococcal: No  Tetanus: Up-to-date    Social History:   Tobacco:   Social History     Tobacco Use   Smoking Status Never   Smokeless Tobacco Never      Alcohol:     Social History     Substance and Sexual Activity   Alcohol Use Yes    Comment: occasional       Vitals:           /85 (BP Location: Left arm, Patient Position: Lying)   Pulse 75   Temp 97.1 °F (36.2 °C) (Temporal)   Resp 18   LMP 2023 (Approximate)   SpO2 98%     Physical Exam:  General Appearance:    Alert, cooperative, no distress, appears stated age   Head:    Normocephalic, without obvious abnormality, atraumatic   Lungs:    Clear to auscultation bilaterally to the bases    Heart:  S1-S2 without rubs murmurs or gallops    Abdomen:   Soft, nontender, bowel sounds present throughout.   Breast Exam:    deferred   Genitalia:    deferred   Extremities:   Extremities normal, atraumatic, no cyanosis or edema   Skin:   Skin color, texture, turgor normal, no rashes or lesions   Neurologic:   Grossly intact   Results Review  LABS:  Lab Results   Component Value Date    WBC 6.42 2023    HGB 13.0 2023    HCT 40.1 2023    MCV 90.1 2023     2023    NEUTROABS 3.73 2023    GLUCOSE 106 (H) 2023    BUN 10 2023    CREATININE 0.69 2023    EGFRIFNONA 110 2020     2023    K 4.5 2023     2023    CO2 26.0 2023    MG 1.9 2020    CALCIUM 8.9 2023    ALBUMIN 3.70 2020    AST 18 2020    ALT 18  01/07/2020    BILITOT 0.4 01/07/2020    PTT 30.2 04/12/2023    INR 0.98 04/12/2023       RADIOLOGY:  No radiology results for the last 3 days     I reviewed the patient's new clinical results.    Cancer Staging (if applicable)  Cancer Patient: __ yes __no __unknown; If yes, clinical stage T:__ N:__M:__, stage group or __N/A    Impression: Primary osteoarthritis right knee  Past history of ASD repair  Hypothyroidism  Arthritis  Allergy to Imitrex      Plan: Right total knee arthroplasty, right      SETH Akers   04/25/23   6:46 AM EDT    Agree with above - plan for right TKA    Daren Giordano MD  04/25/23  07:28 EDT

## 2023-04-25 NOTE — OP NOTE
DATE OF PROCEDURE: 04/25/23    PREOPERATIVE DIAGNOSIS: right knee arthritis      POSTOPERATIVE DIAGNOSIS:  right knee arthritis    PROCEDURES PERFORMED:   right total knee arthroplasty with [employer] components (#6 narrow cruciate retaining femur, #5 tibia, 10 mm polyethylene, with 32 three peg patella) with CORI robotic assistance    Surgical Approach: Knee Medial Parapatellar     SURGEON: Daren Giordano MD    ASSISTANT: Faisal Green PA-C  (Faisal Green PA-C was present and necessary for positioning, draping, retraction, instrumentation and closure.)    SPECIMENS: None    IMPLANTS:   Implant Name Type Inv. Item Serial No.  Lot No. LRB No. Used Action   CMT BONE PALACOS R HI/VISC 1X40 - [id number] Implant CMT BONE PALACOS R HI/VISC 1X40  [facility] [id number] Right 2 Implanted   DEV CONTRL TISS STRATAFIX SPIRAL MNCRYL UD 3/0 PLS 60CM - [id number] Implant DEV CONTRL TISS STRATAFIX SPIRAL MNCRYL UD 3/0 PLS 60CM  [employer]  Right 1 Implanted   DEV CONTRL TISS STRATAFIX SYMM PDS PLUS JOSE CT-1 45CM - [id number] Implant DEV CONTRL TISS STRATAFIX SYMM PDS PLUS JOSE CT-1 45CM  [employer]  [employer]  Right 1 Implanted   BASE TIB/KN GEN2 NONPOR TI SZ5 RT - [id number] Implant BASE TIB/KN GEN2 NONPOR TI SZ5 RT  [employer] [id number] Right 1 Implanted   PATELLA RESRF GEN2 7.5X32MM - [id number] Implant PATELLA RESRF GEN2 7.5X32MM  [employer] [id number] Right 1 Implanted   COMP FEM LEGION OXINIUM CR NRW  SZ6N RT - [id number] Implant COMP FEM LEGION OXINIUM CR NRW  SZ6N RT  [employer] [id number] Right 1 Implanted   INSRT ART/KN LEGION CR HF XLPE SZ5TO6 10MM - [id number] Implant INSRT ART/KN LEGION CR HF XLPE SZ5TO6 10MM  [employer] [id number] Right 1 Implanted         ANESTHESIA:  Spinal    STAFF:  Circulator: Akira Grant RN  Scrub Person: Quang Metcalf RN  Vendor Representative: Luis A Garcia ([employer])  Nursing Assistant: Isaac  LI Brower; Charissa Dai PCT  Assistant: Mitchell Green PA    TOURNIQUET TIME: 19 minutes    ESTIMATED BLOOD LOSS: 150 mL     COMPLICATIONS: None    PREOPERATIVE ANTIBIOTICS: Ancef 2 g    INDICATIONS: The patient is a 47 y.o. female with debilitating right knee pain secondary to osteoarthritis with valgus alignment that failed to improve in spite of conservative treatment .  Options have been discussed at length with the patient and the patient has had an extended course of conservative treatment without long-term benefit. The patient has reached the point where the patient desires total knee arthroplasty surgery and understands the risks, benefits, and alternatives. Consent was obtained. Please see my office notes for details with regard to preoperative counseling and operative rationale.     DESCRIPTION OF PROCEDURE: The patient was positively identified in the preoperative holding area and brought to the operating suite and placed in a supine position. After adequate spinal anesthetic had been achieved, the right lower extremity was prepped and draped in the usual sterile fashion.  After application of a tourniquet to the right upper thigh, which was used during the procedure for a total 19 minutes during the cementation process only. Landmarks of the knee were identified and timeout procedure was performed to confirm the operative site, as well as other parameters. Following the sterile prep and drape, a skin incision was made just off the medial aspect of midline for a medial parapatellar approach. Following a sharp skin incision, dissection was carried down to the level of the extensor mechanism and a medial parapatellar arthrotomy was made and the patella was tucked into the lateral gutter.  Anterior horns of the medial and lateral menisci were removed, and ACL transected.  Osteophytes were also removed.  Description of arthritis: Bone-on-bone contact lateral compartment, tricompartmental osteophytes,  valgus alignment.      ScoreGrid robotic system was then employed to assist with preparation of the femoral and tibial bone surfaces.  Femoral and tibial arrays were placed, as well as markers in both the femur and tibia.  System was registered in a systematic fashion, and 3D models created of both the femoral and tibial aspects.  Range of motion and gap assessments were also performed, and implants were selected and appropriately sized and oriented both the femur and tibial aspects starting with the flexion gap, and then progressing to the extension gap.  Planning was made for a 6 cruciate retaining femoral component and a 5 tibial component with a 9 mm insert.  Once balancing had been achieved, distal femoral surface was then prepared with the CORI rafael, followed by preparation for the 4-in-1 cutting block.  Proximal tibial cut was then performed in a guided fashion, posterior condyles were freed of osteophytes, and trial implants placed, namely a 6 cruciate retaining femur with a 5 tibia and a 9 mm trial insert.  Range of motion and gap assessments were again performed, and excellent balancing was noted.  The patella was then prepared for a 32 three peg patella which had excellent tracking.      Therefore the trial components were removed, and preparations made for final placement, and final components were cemented in place with namely a #5 tibia, #6 narrow cruciate retaining femur, and a 32 three peg patella with a trial 10 mm insert for cement compression. All the excess cement was removed from the bone implant interface and allowed to harden. Tourniquet was deflated. Hemostasis was obtained with electrocautery. There was no brisk bleeding noted in the popliteal fossa in particular. Therefore, the knee was copiously irrigated as it was between major steps, and the final 10 mm insert was placed as this was deemed appropriate for the patient's anatomy with full flexion and extension and no instability and attention  was then directed towards closure. The medial parapatellar arthrotomy was closed with #1 Vicryl in an interrupted figure-of-eight fashion in 4 strategic locations followed by oversewing this from proximal to distal with a #1 StrataFix symmetric, which nicely sealed the joint, followed by closure of the deep fascial layer with #1 Vicryl in a buried interrupted fashion, followed by closure of the subcutaneous layer with 2-0 Vicryl and the skin with 3-0 Stratafix in a running subcuticular fashion.  Adhesive wound closure dressing was applied followed by a sterile dressing with 4 x 4's, abdominal pad, soft roll and Ace wrap. The patient tolerated the procedure well and was brought to the recovery room in good condition.     PLAN:  1.  The patient will begin early range of motion and weight-bearing per the post total knee arthroplasty protocol.   2.  I anticipate brief hospitalization for initial rehabilitation and pain control followed by continued rehabilitation home health/outpatient physical therapy setting.  Patient likely ready for discharge later today as long as she is cleared medically and by physical therapy.  Follow-up in 3 weeks as planned.   3.  Postoperative medical management with Dr. Castillo.  4.  Aspirin will be utilized for DVT prophylaxis.       Daren Giordano MD  04/25/23  09:59 EDT

## 2023-04-25 NOTE — ANESTHESIA PROCEDURE NOTES
Spinal Block      Patient reassessed immediately prior to procedure    Patient location during procedure: OR  Start Time: 4/25/2023 7:35 AM  Indication:at surgeon's request  Performed By  CRNA/YANETH: Phoenix Adams CRNA  Preanesthetic Checklist  Completed: patient identified, IV checked, site marked, risks and benefits discussed, surgical consent, monitors and equipment checked, pre-op evaluation and timeout performed  Spinal Block Prep:  Patient Position:sitting  Sterile Tech:cap, gloves, sterile barriers and mask  Prep:Chloraprep  Patient Monitoring:blood pressure monitoring, continuous pulse oximetry and EKG    Spinal Block Procedure  Approach:midline  Guidance:landmark technique and palpation technique  Location:L4-L5  Needle Type:Sprotte (Introducer:   no)  Needle Gauge:25 G  Placement of Spinal needle event:cerebrospinal fluid aspirated  Paresthesia: no  Fluid Appearance:clear  Medications: bupivacaine (MARCAINE) 0.5 % injection - Injection   1.8 mL - 4/25/2023 7:37:00 AM   Post Assessment  Patient Tolerance:patient tolerated the procedure well with no apparent complications  Complications no  Additional Notes  Procedure:  Pt assisted to sitting position, with legs in position of comfort over side of bed.  Pt. instructed in optimal spine presentation, the spine was prepped/ Draped and the skin at insertion site was anesthetized with 1% Lidocaine 2 ml.  The spinal needle was then advanced until CSF flow was obtained and LA was injected:

## 2023-05-05 ENCOUNTER — TELEPHONE (OUTPATIENT)
Dept: ORTHOPEDIC SURGERY | Facility: CLINIC | Age: 48
End: 2023-05-05
Payer: COMMERCIAL

## 2023-05-05 DIAGNOSIS — Z96.651 S/P TKR (TOTAL KNEE REPLACEMENT), RIGHT: ICD-10-CM

## 2023-05-05 RX ORDER — OXYCODONE HYDROCHLORIDE 5 MG/1
5 TABLET ORAL EVERY 4 HOURS PRN
Qty: 40 TABLET | Refills: 0 | Status: CANCELLED | OUTPATIENT
Start: 2023-05-05

## 2023-05-05 RX ORDER — OXYCODONE HYDROCHLORIDE 5 MG/1
5 TABLET ORAL EVERY 6 HOURS PRN
Qty: 30 TABLET | Refills: 0 | Status: SHIPPED | OUTPATIENT
Start: 2023-05-05

## 2023-05-05 NOTE — TELEPHONE ENCOUNTER
Patient has been limiting how many pain pills she is taking in between doses of Tylenol.  She says the leg is worse at night with sleeping.  I advised her to take 2 of the oxycodone at night to try to get her through the night and hopefully some rest.  She is going to switch to ibuprofen from the Meloxicam as she feels the ibuprofen is more effective for her.  She will try this and see if this helps.      Huong ANGELES(R)

## 2023-05-05 NOTE — TELEPHONE ENCOUNTER
Pt called.  She is having trouble sleeping. She said her leg seizes and feels tight. She hasn't been taking the pain killers regularly and she is only 10 days out from total knee replacement.    Pt said she didn't want to run out of pain medicine. If appropriate please refill her pain meds and ask if she can take something to help her sleep.      She uses [employer]:  Phone: [phone] Fax: [phone]      Call pt to advise  [phone]

## 2023-05-17 ENCOUNTER — OFFICE VISIT (OUTPATIENT)
Dept: ORTHOPEDIC SURGERY | Facility: CLINIC | Age: 48
End: 2023-05-17
Payer: COMMERCIAL

## 2023-05-17 VITALS — TEMPERATURE: 97.3 F

## 2023-05-17 DIAGNOSIS — Z47.89 ORTHOPEDIC AFTERCARE: ICD-10-CM

## 2023-05-17 DIAGNOSIS — Z96.651 S/P TKR (TOTAL KNEE REPLACEMENT), RIGHT: Primary | ICD-10-CM

## 2023-05-17 PROCEDURE — 99024 POSTOP FOLLOW-UP VISIT: CPT | Performed by: ORTHOPAEDIC SURGERY

## 2023-05-17 NOTE — PROGRESS NOTES
[facility] [place] Orthopaedic Surgery Clinic Note    Subjective     Chief Complaint   Patient presents with   • Post-op     3 weeks status post Total Knee Arthroplasty With Cori - Right 2023        HPI    It has been 3  week(s) since Ms. Jones's last visit. She returns to clinic today for postoperative follow-up of right knee arthroplasty. The issue has been ongoing for 3 week(s). She rates her pain a 3/10 on the pain scale. Previous/current treatments: bracing, cane/walker, NSAIDS and physical therapy. Current symptoms: pain, swelling and stiffness. The pain is worse with standing, sitting, sleeping, lying on affected side and any movement of the joint; ice, pain medication and/or NSAID and elevating the extremity improve the pain. Overall, she is doing better.  Making improvements.    I have reviewed the following portions of the patient's history and agree with: History of Present Illness and Review of Systems    Patient Active Problem List   Diagnosis   • History of  delivery, antepartum   • Family history of congenital heart defect   • Diet controlled gestational diabetes mellitus (GDM) in third trimester   • Status post repeat low transverse  section   • Primary osteoarthritis of right knee   • S/P TKR (total knee replacement), right     Past Medical History:   Diagnosis Date   • Acid reflux    • Arthritis    • ASD (atrial septal defect)     Surgically repaired   • Gestational diabetes    • History of transfusion     possible at the age of 7 after ASD repair, no reaction, [facility]   • Hypothyroidism    • Knee swelling    • Migraines    • Psoriasis       Past Surgical History:   Procedure Laterality Date   • CARDIAC SURGERY      ASD repair   •  SECTION      x2   •  SECTION WITH TUBAL Bilateral 2017    Procedure:  SECTION REPEAT WITH TUBAL;  Surgeon: Kaela Blue MD;  Location: [facility] LABOR DELIVERY;  Service:    • ELBOW PROCEDURE     • FOOT  SURGERY Left     hardware removed   • KNEE ARTHROSCOPY Right     1994, 2003, 2005   • LAPAROSCOPIC CHOLECYSTECTOMY  2002   • ORIF ELBOW FRACTURE Left 1980   • ORIF FOOT FRACTURE Left 2007   • TOTAL KNEE ARTHROPLASTY Right 04/25/2023    Procedure: TOTAL KNEE ARTHROPLASTY WITH CORI - RIGHT;  Surgeon: Daren Giordano MD;  Location: [facility];  Service: Robotics - Ortho;  Laterality: Right;      Family History   Problem Relation Age of Onset   • Stroke Mother    • Thyroid disease Mother    • Arthritis Father    • Heart attack Father    • Hyperlipidemia Father    • Breast cancer Maternal Grandmother      Social History     Socioeconomic History   • Marital status:    Tobacco Use   • Smoking status: Never   • Smokeless tobacco: Never   Vaping Use   • Vaping Use: Never used   Substance and Sexual Activity   • Alcohol use: Yes     Comment: occasional   • Drug use: No   • Sexual activity: Yes     Partners: Male     Birth control/protection: Tubal ligation      Current Outpatient Medications on File Prior to Visit   Medication Sig Dispense Refill   • aspirin EC (aspirin) 325 MG tablet Take 1 tablet by mouth Daily for 30 days. 30 tablet 0   • famotidine (PEPCID) 20 MG tablet Take 1 tablet by mouth Daily As Needed for Heartburn.     • oxyCODONE (ROXICODONE) 5 MG immediate release tablet Take 1 tablet by mouth Every 6 (Six) Hours As Needed for Moderate Pain. 30 tablet 0   • [DISCONTINUED] ropivacaine (NAROPIN) 0.2 % infusion (INFUSYSTEM) 2 mg/hr by Peripheral Nerve route Continuous.       No current facility-administered medications on file prior to visit.      Allergies   Allergen Reactions   • Bee Venom Swelling   • Imitrex [Sumatriptan] Shortness Of Breath   • Poison Ivy Extract Hives        Review of Systems   Constitutional: Negative for activity change, appetite change, chills, diaphoresis, fatigue, fever and unexpected weight change.   HENT: Negative for congestion, dental problem, drooling, ear discharge, ear  pain, facial swelling, hearing loss, mouth sores, nosebleeds, postnasal drip, rhinorrhea, sinus pressure, sneezing, sore throat, tinnitus, trouble swallowing and voice change.    Eyes: Negative for photophobia, pain, discharge, redness, itching and visual disturbance.   Respiratory: Negative for apnea, cough, choking, chest tightness, shortness of breath, wheezing and stridor.    Cardiovascular: Negative for chest pain, palpitations and leg swelling.   Gastrointestinal: Negative for abdominal distention, abdominal pain, anal bleeding, blood in stool, constipation, diarrhea, nausea, rectal pain and vomiting.   Endocrine: Negative for cold intolerance, heat intolerance, polydipsia, polyphagia and polyuria.   Genitourinary: Negative for decreased urine volume, difficulty urinating, dysuria, enuresis, flank pain, frequency, genital sores, hematuria and urgency.   Musculoskeletal: Positive for arthralgias. Negative for back pain, gait problem, joint swelling, myalgias, neck pain and neck stiffness.   Skin: Negative for color change, pallor, rash and wound.   Allergic/Immunologic: Negative for environmental allergies, food allergies and immunocompromised state.   Neurological: Negative for dizziness, tremors, seizures, syncope, facial asymmetry, speech difficulty, weakness, light-headedness, numbness and headaches.   Hematological: Negative for adenopathy. Does not bruise/bleed easily.   Psychiatric/Behavioral: Negative for agitation, behavioral problems, confusion, decreased concentration, dysphoric mood, hallucinations, self-injury, sleep disturbance and suicidal ideas. The patient is not nervous/anxious and is not hyperactive.         Objective      Physical Exam  Temp 97.3 °F (36.3 °C)   LMP 04/18/2023 (Approximate)     There is no height or weight on file to calculate BMI.    General:   Mental Status:  Alert   Appearance: Cooperative, in no acute distress   Orientation: Alert and oriented to person, place and  time   Posture: Normal   Gait: With a cane    Integument:   Right knee: Wound is well-healed with no signs of infection    Lower Extremities:   Right Knee:    Tenderness:  None    Effusion:  1+    Swelling: None    Crepitus:  None    Range of motion:  Extension: 5°       Flexion: 100°  Instability:  No varus laxity, no valgus laxity, negative anterior drawer  Deformities:  None      Imaging/Studies  Imaging Results (Last 24 Hours)     Procedure Component Value Units Date/Time    XR Knee 3+ View With Ringo Right [[id number]] Resulted: 05/17/23 1456     Updated: 05/17/23 1456    Narrative:      Right Knee Radiographs  Indication: status-post right total knee arthroplasty  Views: AP, lateral, and sunrise views of the right knee    Comparison: no change compared to prior study, 4/25/2023    Findings:   The components are well aligned, with no signs of loosening or failure.            Assessment and Plan     Diagnoses and all orders for this visit:    1. S/P TKR (total knee replacement), right (Primary)  -     XR Knee 3+ View With Ringo Right    2. Orthopedic aftercare        1. S/P TKR (total knee replacement), right    2. Orthopedic aftercare        I reviewed my findings with the patient and her .  Her right total knee arthroplasty is functioning well, she is pleased with results so far.  Continue with therapy, and I will see her back in 6 weeks, but sooner for any problems.    Return in about 6 weeks (around 6/28/2023).      Daren Giordano MD  05/17/23  15:02 EDT

## 2023-05-31 ENCOUNTER — TELEPHONE (OUTPATIENT)
Dept: ORTHOPEDIC SURGERY | Facility: CLINIC | Age: 48
End: 2023-05-31

## 2023-05-31 NOTE — TELEPHONE ENCOUNTER
Left voicemail letting patient know she can swim and submerge knee 6 weeks after surgery as long as the incision is healed.      Huong ANGELES(R)

## 2024-05-01 ENCOUNTER — OFFICE VISIT (OUTPATIENT)
Dept: ORTHOPEDIC SURGERY | Facility: CLINIC | Age: 49
End: 2024-05-01
Payer: COMMERCIAL

## 2024-05-01 VITALS
BODY MASS INDEX: 39.54 KG/M2 | HEIGHT: 70 IN | DIASTOLIC BLOOD PRESSURE: 68 MMHG | SYSTOLIC BLOOD PRESSURE: 118 MMHG | WEIGHT: 276.2 LBS

## 2024-05-01 DIAGNOSIS — Z96.651 TOTAL KNEE REPLACEMENT STATUS, RIGHT: Primary | ICD-10-CM

## 2024-05-01 DIAGNOSIS — Z09 POSTOPERATIVE EXAMINATION: ICD-10-CM

## 2024-05-01 RX ORDER — PSEUDOEPHEDRINE HCL 120 MG/1
120 TABLET, FILM COATED, EXTENDED RELEASE ORAL EVERY 12 HOURS
COMMUNITY
Start: 2023-11-27

## 2024-05-01 NOTE — PROGRESS NOTES
[facility] Orthopaedic Surgery Clinic Note    Subjective     Chief Complaint   Patient presents with    Follow-up     10 month f/u; 1 year status post right total knee arthroplasty with cori robot (23        HPI    It has been 10  month(s) since Ms. Jones's last visit. She returns to clinic today for follow-up of right knee arthroplasty. The issue has been ongoing for 1 year(s). She rates her pain a 0/10 on the pain scale. Previous/current treatments: bracing, NSAIDS, and physical therapy. Current symptoms:  nothing . The pain is worse with  nothing ; resting improve the pain. Overall, she is doing better.  100% improvement compared to her preoperative symptoms.  Fully ambulatory without external aids.    I have reviewed the following portions of the patient's history and agree with: History of Present Illness and Review of Systems    Patient Active Problem List   Diagnosis    History of  delivery, antepartum    Family history of congenital heart defect    Diet controlled gestational diabetes mellitus (GDM) in third trimester    Status post repeat low transverse  section    Primary osteoarthritis of right knee    S/P TKR (total knee replacement), right     Past Medical History:   Diagnosis Date    Acid reflux     Arthritis     ASD (atrial septal defect)     Surgically repaired    Gestational diabetes     History of transfusion     possible at the age of 7 after ASD repair, no reaction, [facility]    Hypothyroidism     Knee swelling     Migraines     Psoriasis       Past Surgical History:   Procedure Laterality Date    CARDIAC SURGERY      ASD repair     SECTION      x2     SECTION WITH TUBAL Bilateral 2017    Procedure:  SECTION REPEAT WITH TUBAL;  Surgeon: Kaela Blue MD;  [facility];  Service:     ELBOW PROCEDURE      FOOT SURGERY Left     hardware removed    KNEE ARTHROSCOPY Right     , ,     LAPAROSCOPIC  CHOLECYSTECTOMY  2002    ORIF ELBOW FRACTURE Left 1980    ORIF FOOT FRACTURE Left 2007    TOTAL KNEE ARTHROPLASTY Right 04/25/2023    Procedure: TOTAL KNEE ARTHROPLASTY WITH CORI - RIGHT;  Surgeon: Daren Giordano MD;  Location: [facility];  Service: Robotics - Ortho;  Laterality: Right;      Family History   Problem Relation Age of Onset    Stroke Mother     Thyroid disease Mother     Arthritis Father     Heart attack Father     Hyperlipidemia Father     Breast cancer Maternal Grandmother      Social History     Socioeconomic History    Marital status:    Tobacco Use    Smoking status: Never    Smokeless tobacco: Never   Vaping Use    Vaping status: Never Used   Substance and Sexual Activity    Alcohol use: Yes     Comment: occasional    Drug use: No    Sexual activity: Yes     Partners: Male     Birth control/protection: Tubal ligation      Current Outpatient Medications on File Prior to Visit   Medication Sig Dispense Refill    acetaminophen (TYLENOL) 650 MG 8 hr tablet Take 1 tablet by mouth Every 8 (Eight) Hours As Needed for Mild Pain.      famotidine (PEPCID) 20 MG tablet Take 1 tablet by mouth Daily As Needed for Heartburn.      ibuprofen (ADVIL,MOTRIN) 600 MG tablet Take 1 tablet by mouth 2 (Two) Times a Day As Needed for Mild Pain.      pseudoephedrine (SUDAFED) 120 MG 12 hr tablet Take 1 tablet by mouth Every 12 (Twelve) Hours.       No current facility-administered medications on file prior to visit.      Allergies   Allergen Reactions    Bee Venom Swelling    Imitrex [Sumatriptan] Shortness Of Breath    Poison Ivy Extract Hives        Review of Systems   Constitutional:  Negative for activity change, appetite change, chills, diaphoresis, fatigue, fever and unexpected weight change.   HENT:  Negative for congestion, dental problem, drooling, ear discharge, ear pain, facial swelling, hearing loss, mouth sores, nosebleeds, postnasal drip, rhinorrhea, sinus pressure, sneezing, sore throat, tinnitus,  "trouble swallowing and voice change.    Eyes:  Negative for photophobia, pain, discharge, redness, itching and visual disturbance.   Respiratory:  Negative for apnea, cough, choking, chest tightness, shortness of breath, wheezing and stridor.    Cardiovascular:  Negative for chest pain, palpitations and leg swelling.   Gastrointestinal:  Negative for abdominal distention, abdominal pain, anal bleeding, blood in stool, constipation, diarrhea, nausea, rectal pain and vomiting.   Endocrine: Negative for cold intolerance, heat intolerance, polydipsia, polyphagia and polyuria.   Genitourinary:  Negative for decreased urine volume, difficulty urinating, dysuria, enuresis, flank pain, frequency, genital sores, hematuria and urgency.   Musculoskeletal:  Positive for arthralgias. Negative for back pain, gait problem, joint swelling, myalgias, neck pain and neck stiffness.   Skin:  Negative for color change, pallor, rash and wound.   Allergic/Immunologic: Negative for environmental allergies, food allergies and immunocompromised state.   Neurological:  Negative for dizziness, tremors, seizures, syncope, facial asymmetry, speech difficulty, weakness, light-headedness, numbness and headaches.   Hematological:  Negative for adenopathy. Does not bruise/bleed easily.   Psychiatric/Behavioral:  Negative for agitation, behavioral problems, confusion, decreased concentration, dysphoric mood, hallucinations, self-injury, sleep disturbance and suicidal ideas. The patient is not nervous/anxious and is not hyperactive.         Objective      Physical Exam  /68   Ht 177.8 cm (70\")   Wt 125 kg (276 lb 3.2 oz)   BMI 39.63 kg/m²     Body mass index is 39.63 kg/m².    General:   Mental Status:  Alert   Appearance: Cooperative, in no acute distress   Orientation: Alert and oriented to person, place and time   Posture: Normal   Gait: Nonantalgic    Integument:   Right knee: Wound is well-healed with no signs of infection    Lower " Extremities:   Right Knee:    Tenderness:  Mild medial/lateral joint line tenderness    Effusion:  None    Swelling: None    Crepitus:  None    Range of motion:  Extension: 0°       Flexion: 125°  Instability:  No varus laxity, no valgus laxity, negative anterior drawer  Deformities:  None      Imaging/Studies  Imaging Results (Last 24 Hours)       Procedure Component Value Units Date/Time    XR Knee 3+ View With [place] Right [[id number]] Resulted: 05/01/24 0950     Updated: 05/01/24 0954    Narrative:      Right Knee Radiographs  Indication: status-post right total knee arthroplasty  Views: AP, lateral, and sunrise views of the right knee    Comparison: no change compared to prior study, 5/17/2023    Findings:   The components are well aligned, with no signs of loosening or failure.                 Assessment and Plan     Diagnoses and all orders for this visit:    1. Total knee replacement status, right (Primary)  -     XR Knee 3+ View With [place] Right    2. Postoperative examination        1. Total knee replacement status, right    2. Postoperative examination        I reviewed my findings with the patient.  Her right total knee arthroplasty is functioning well and she is pleased with results.  I will see her back in 4 years, which will be a 5-year checkup with x-rays.  I will see her back sooner for any problems.    At the end of our visit today she was telling me about her left foot, and her arch has been dropping on that side and she would like to have it evaluated.  We will make a referral for her to see Dr. Trevino.    Return in about 4 years (around 5/1/2028) for recheck with x-rays.      Daren Giordano MD  05/01/24  09:56 EDT    Dictated Utilizing Dragon Dictation

## 2024-05-08 ENCOUNTER — OFFICE VISIT (OUTPATIENT)
Dept: ORTHOPEDIC SURGERY | Facility: CLINIC | Age: 49
End: 2024-05-08
Payer: COMMERCIAL

## 2024-05-08 VITALS
WEIGHT: 275.2 LBS | BODY MASS INDEX: 40.76 KG/M2 | DIASTOLIC BLOOD PRESSURE: 88 MMHG | HEIGHT: 69 IN | SYSTOLIC BLOOD PRESSURE: 138 MMHG

## 2024-05-08 DIAGNOSIS — M76.822 POSTERIOR TIBIAL TENDINITIS OF LEFT LOWER EXTREMITY: ICD-10-CM

## 2024-05-08 DIAGNOSIS — I87.8 VENOUS STASIS: ICD-10-CM

## 2024-05-08 DIAGNOSIS — M25.572 LEFT ANKLE PAIN, UNSPECIFIED CHRONICITY: ICD-10-CM

## 2024-05-08 DIAGNOSIS — M79.672 LEFT FOOT PAIN: Primary | ICD-10-CM

## 2024-05-08 DIAGNOSIS — E66.01 CLASS 3 SEVERE OBESITY DUE TO EXCESS CALORIES WITHOUT SERIOUS COMORBIDITY WITH BODY MASS INDEX (BMI) OF 40.0 TO 44.9 IN ADULT: ICD-10-CM

## 2024-05-08 PROCEDURE — 99214 OFFICE O/P EST MOD 30 MIN: CPT | Performed by: ORTHOPAEDIC SURGERY

## (undated) DEVICE — ANTIBACTERIAL UNDYED BRAIDED (POLYGLACTIN 910), SYNTHETIC ABSORBABLE SUTURE: Brand: COATED VICRYL

## (undated) DEVICE — SYS CLS SKIN PREMIERPRO EXOFINFUSION 22CM

## (undated) DEVICE — NDL HYPO ECLPS SFTY 18G 1 1/2IN

## (undated) DEVICE — BLANKT WARM UPPR/BDY ARM/OUT 57X196CM

## (undated) DEVICE — 39" SINGLE PATIENT USE HOVERMATT: Brand: SINGLE PATIENT USE HOVERMATT

## (undated) DEVICE — TRAP FLD MINIVAC MEGADYNE 100ML

## (undated) DEVICE — SUT GUT PLN 0 STD TIE 54IN S104H

## (undated) DEVICE — PAD ARMBRD SURG CONVOL 7.5X20X2IN

## (undated) DEVICE — Device

## (undated) DEVICE — SOL IRR H2O BTL 1000ML STRL

## (undated) DEVICE — CVR FTSWITCH UNIV

## (undated) DEVICE — GLV SURG SENSICARE W/ALOE PF LF 9 STRL

## (undated) DEVICE — PK KN TOTL 10

## (undated) DEVICE — PK C/SECT 10

## (undated) DEVICE — SUT VIC 2/0 CT1 27IN J259H

## (undated) DEVICE — SHEET,DRAPE,40X58,STERILE: Brand: MEDLINE

## (undated) DEVICE — SOL IRR NACL 0.9PCT BT 1000ML

## (undated) DEVICE — TRY SPINE BLCK WHITACRE 25G 3X5IN

## (undated) DEVICE — GLV SURG SENSICARE W/ALOE PF LF 6.5 STRL

## (undated) DEVICE — CEMENT MIXING SYSTEM WITH MIS FEMORAL BREAKAWAY NOZZLE: Brand: REVOLUTION

## (undated) DEVICE — UNDRPD COMFRT GLD DRYPAD 36X57IN

## (undated) DEVICE — SUT MONOCRYL PLS ANTIB UND 3/0  PS1 27IN

## (undated) DEVICE — DISPOSABLE TOURNIQUET CUFF SINGLE BLADDER, DUAL PORT AND QUICK CONNECT CONNECTOR: Brand: COLOR CUFF

## (undated) DEVICE — UNDERCAST PADDING: Brand: DEROYAL

## (undated) DEVICE — SUT MONOCRYL SZ 4 0 18IN PS1 Y682H BX/36

## (undated) DEVICE — BNDG ELAS W/CLIP 6IN 10YD LF STRL

## (undated) DEVICE — DRSNG PAD ABD 8X10IN STRL

## (undated) DEVICE — STRYKER PERFORMANCE SERIES SAGITTAL BLADE: Brand: STRYKER PERFORMANCE SERIES

## (undated) DEVICE — TRY EPID SFTY 18G 3.5IN 1T7680

## (undated) DEVICE — SUT PLAIN  3/0 CT1 27IN 842H

## (undated) DEVICE — PATIENT RETURN ELECTRODE, SINGLE-USE, CONTACT QUALITY MONITORING, ADULT, WITH 9FT CORD, FOR PATIENTS WEIGING OVER 33LBS. (15KG): Brand: MEGADYNE

## (undated) DEVICE — ADHS SKIN PREMIERPRO EXOFIN TOPICAL HI/VISC .5ML

## (undated) DEVICE — DRAPE,TOP,102X53,STERILE: Brand: MEDLINE

## (undated) DEVICE — SHT AIR TRANSFR COMFRT GLIDE LAT 40X80IN

## (undated) DEVICE — GLV SURG PREMIERPRO MIC LTX PF SZ8.5 BRN

## (undated) DEVICE — HANDPIECE SET WITH HIGH FLOW TIP AND SUCTION TUBE: Brand: INTERPULSE

## (undated) DEVICE — TBG PENCL TELESCP MEGADYNE SMOKE EVAC 10FT